# Patient Record
Sex: FEMALE | Race: OTHER | NOT HISPANIC OR LATINO | URBAN - METROPOLITAN AREA
[De-identification: names, ages, dates, MRNs, and addresses within clinical notes are randomized per-mention and may not be internally consistent; named-entity substitution may affect disease eponyms.]

---

## 2024-05-22 ENCOUNTER — OFFICE VISIT (OUTPATIENT)
Dept: OBSTETRICS AND GYNECOLOGY | Facility: CLINIC | Age: 29
End: 2024-05-22
Payer: MEDICAID

## 2024-05-22 VITALS
WEIGHT: 216.88 LBS | DIASTOLIC BLOOD PRESSURE: 82 MMHG | HEIGHT: 66 IN | SYSTOLIC BLOOD PRESSURE: 120 MMHG | BODY MASS INDEX: 34.86 KG/M2

## 2024-05-22 DIAGNOSIS — Z32.00 ENCOUNTER FOR CONFIRMATION OF PREGNANCY TEST RESULT WITH PHYSICAL EXAMINATION: Primary | ICD-10-CM

## 2024-05-22 DIAGNOSIS — Z32.01 POSITIVE PREGNANCY TEST: ICD-10-CM

## 2024-05-22 LAB
B-HCG UR QL: POSITIVE
CTP QC/QA: YES

## 2024-05-22 PROCEDURE — 81025 URINE PREGNANCY TEST: CPT | Mod: PBBFAC | Performed by: OBSTETRICS & GYNECOLOGY

## 2024-05-22 PROCEDURE — 3008F BODY MASS INDEX DOCD: CPT | Mod: CPTII,,, | Performed by: OBSTETRICS & GYNECOLOGY

## 2024-05-22 PROCEDURE — 87591 N.GONORRHOEAE DNA AMP PROB: CPT | Performed by: OBSTETRICS & GYNECOLOGY

## 2024-05-22 PROCEDURE — 3079F DIAST BP 80-89 MM HG: CPT | Mod: CPTII,,, | Performed by: OBSTETRICS & GYNECOLOGY

## 2024-05-22 PROCEDURE — 87086 URINE CULTURE/COLONY COUNT: CPT | Performed by: OBSTETRICS & GYNECOLOGY

## 2024-05-22 PROCEDURE — 99999 PR PBB SHADOW E&M-NEW PATIENT-LVL II: CPT | Mod: PBBFAC,,, | Performed by: OBSTETRICS & GYNECOLOGY

## 2024-05-22 PROCEDURE — 99203 OFFICE O/P NEW LOW 30 MIN: CPT | Mod: S$PBB,TH,, | Performed by: OBSTETRICS & GYNECOLOGY

## 2024-05-22 PROCEDURE — 99202 OFFICE O/P NEW SF 15 MIN: CPT | Mod: PBBFAC,TH | Performed by: OBSTETRICS & GYNECOLOGY

## 2024-05-22 PROCEDURE — 3074F SYST BP LT 130 MM HG: CPT | Mod: CPTII,,, | Performed by: OBSTETRICS & GYNECOLOGY

## 2024-05-22 PROCEDURE — 99999PBSHW POCT URINE PREGNANCY: Mod: PBBFAC,,,

## 2024-05-22 RX ORDER — PRENATAL WITH FERROUS FUM AND FOLIC ACID 3080; 920; 120; 400; 22; 1.84; 3; 20; 10; 1; 12; 200; 27; 25; 2 [IU]/1; [IU]/1; MG/1; [IU]/1; MG/1; MG/1; MG/1; MG/1; MG/1; MG/1; UG/1; MG/1; MG/1; MG/1; MG/1
1 TABLET ORAL DAILY
Qty: 90 TABLET | Refills: 3 | Status: SHIPPED | OUTPATIENT
Start: 2024-05-22 | End: 2025-05-22

## 2024-05-22 NOTE — PROGRESS NOTES
"[facility]  Ambulatory Clinic  Obstetrics & Gynecology    Visit Date:  2024     Chief Complaint:  Missed my period    History of Present Illness:      Juan Mcdaniels is a 28 y.o. , UPT positive today, here with c/o missed period.    LMP Patient's last menstrual period was 2024.      Pt has no major complaints today and denies any vaginal bleeding, discharge, pain, GI/ compliants.      Pt reports overall good health.    OBH - current  PMH - none  PSH - none  MEDS - PNV  ALL - NKDA  SH - denies tobacco, etoh, drugs  FH - denies congenital defects, aneuploidy   GYN Hx - denies STI, abn pap    Review of Systems:      Constitutional:  No fever, fatigue  HENT:  No congestion, hearing changes  Eyes:  No visual disturbance  Respiratory:  No cough, shortness of breath  Cardiovascular:  No chest pain, leg swelling  Breast:  No lump, pain, nipple discharge, redness, skin changes  Gastrointestinal:  No abdominal pain, constipation, blood in stool   Genitourinary:  No dysuria, frequency  Endocrine:  No heat or cold intolerance  Musculoskeletal:  No back pain, arthralgias  Skin:  No rash, jaundice  Neurological:  No dizziness, weakness, headaches  Psychiatric/Behavioral:  No sleep disturbance, dysphoric mood     Physical Exam:     /82   Ht 5' 6" (1.676 m)   Wt 98.4 kg (216 lb 13.9 oz)   LMP 2024   BMI 35.00 kg/m²      GENERAL:  NAD. Well-nourished. A&Ox3.  HEENT:  NCAT, EOMI, moist mucus membranes.  Neck supple w/o masses.  BREAST:  Pt declined.  LUNGS:  CTA-B.  HEART:  RRR, physiologic heart sounds.  ABDOMEN:  Soft, non-tender. Normoactive BS.  No obvious organomegaly.    EXT:  Symmetric w/o cramping, claudication, or edema. +2 distal pulses.   SKIN:  No rashes  NEURO:  Grossly intact bilaterally.   PSYCH:  Mood & affect appropriate.     PELVIC:  Pt declined.    Chaperone present for exam.    Assessment:     28 y.o. , UPT positive, LMP 24, here for confirmation of " pregnancy    Plan:    We discussed principles of prenatal care, weight gain goals, dietary/lifestyle modifications, pregnancy care instructions and precautions.  Continue prenatal vitamins.  SAB and ectopic precautions reviewed.      Return in 4 weeks for OB visit, or sooner prn.  All questions answered, pt voiced understanding.      Andrew Lam MD

## 2024-05-24 LAB — BACTERIA UR CULT: NORMAL

## 2024-05-25 LAB
C TRACH DNA SPEC QL NAA+PROBE: NOT DETECTED
N GONORRHOEA DNA SPEC QL NAA+PROBE: NOT DETECTED

## 2024-05-29 ENCOUNTER — TELEPHONE (OUTPATIENT)
Dept: OBSTETRICS AND GYNECOLOGY | Facility: CLINIC | Age: 29
End: 2024-05-29
Payer: MEDICAID

## 2024-05-29 NOTE — TELEPHONE ENCOUNTER
Pt mailbox is not set up at this time.    ----- Message from Hanna Connor sent at 5/29/2024 10:37 AM CDT -----  Type: Patient Call Back    Who called:pt's  yuliet [phone]     What is the request in detail:calling to discuss taking vitamins for pregnancy. Call pt     Can the clinic reply by [facility]?    Would the patient rather a call back or a response via [facility]? call    Best call back number:    Additional Information:

## 2024-05-30 ENCOUNTER — NURSE TRIAGE (OUTPATIENT)
Dept: ADMINISTRATIVE | Facility: CLINIC | Age: 29
End: 2024-05-30
Payer: MEDICAID

## 2024-05-30 ENCOUNTER — TELEPHONE (OUTPATIENT)
Dept: OBSTETRICS AND GYNECOLOGY | Facility: CLINIC | Age: 29
End: 2024-05-30
Payer: MEDICAID

## 2024-05-30 NOTE — TELEPHONE ENCOUNTER
Patient states she is currently 6 weeks gestation and states c/o intermittent abdominal cramping and vaginal bleeding.     Patient advised to See OB/Gyn/PCP or Visit an Urgent Care Center in office today. Patient also advised to contact the O Triage Service for any worsening symptoms. Patient states understanding of care advice.     Reason for Disposition   Intermittent lower abdominal pain (e.g., cramping) lasting > 24 hours    Additional Information   Negative: Shock suspected (e.g., cold/pale/clammy skin, too weak to stand, low BP, rapid pulse)   Negative: Difficult to awaken or acting confused (e.g., disoriented, slurred speech)   Negative: Passed out (i.e., fainted, collapsed and was not responding)   Negative: Sounds like a life-threatening emergency to the triager   Negative: Vaginal bleeding and pregnant 20 or more weeks   Negative: Not pregnant or pregnancy status unknown   Negative: SEVERE abdominal pain (e.g., excruciating)   Negative: SEVERE vaginal bleeding (e.g., soaking 2 pads or tampons per hour and present 2 or more hours; 1 menstrual cup every 2 hours)   Negative: SEVERE dizziness (e.g., unable to stand, requires support to walk, feels like passing out)   Negative: MODERATE vaginal bleeding (i.e., soaking 1 pad) and present > 6 hours   Negative: MODERATE vaginal bleeding (i.e., soaking 1 pad / hour, clots) and pregnant > 12 weeks   Negative: Passed tissue (e.g., gray-white)   Negative: Shoulder pain   Negative: Constant abdominal pain lasting > 1 hour   Negative: Fever 100.4 F (38.0 C) or higher   Negative: Pale skin (pallor) of new-onset or worsening   Negative: Patient sounds very sick or weak to the triager   Negative: MODERATE vaginal bleeding (i.e., soaking 1 pad / hour; clots)    Protocols used: Pregnancy - Vaginal Bleeding Less Than 20 Weeks EGA-A-OH

## 2024-05-30 NOTE — TELEPHONE ENCOUNTER
Call the check up on pt. Pt is doing well. Pt was concerned that she is spotting at this time. I informed pt that it is normal at this time. I she starts to bleed heavy she needs to call for a apt or to go to the ER for evaluation. Pt voiced understanding.

## 2024-06-06 ENCOUNTER — E-VISIT (OUTPATIENT)
Dept: OBSTETRICS AND GYNECOLOGY | Facility: CLINIC | Age: 29
End: 2024-06-06
Payer: MEDICAID

## 2024-06-06 DIAGNOSIS — O21.9 NAUSEA AND VOMITING DURING PREGNANCY: Primary | ICD-10-CM

## 2024-06-06 RX ORDER — ONDANSETRON 4 MG/1
4 TABLET, ORALLY DISINTEGRATING ORAL EVERY 6 HOURS PRN
Qty: 30 TABLET | Refills: 1 | Status: SHIPPED | OUTPATIENT
Start: 2024-06-06

## 2024-06-06 NOTE — PROGRESS NOTES
Patient ID: Juan Mcdaniels is a 28 y.o. female.    Chief Complaint: GI Problem (Entered automatically based on patient selection in Patient Portal.)    The patient initiated a request through [employer] on 6/6/2024 for evaluation and management with a chief complaint of GI Problem (Entered automatically based on patient selection in Patient Portal.)     I evaluated the questionnaire submission on nausea.    [facility] Peq Evisit Diarrhea    6/6/2024  1:01 PM CDT - Filed by Patient   Do you agree to participate in an E-Visit? Yes   If you have any of the following symptoms, please present to your local emergency room or call 911:  I acknowledge   Are you pregnant, could you be pregnant, or are you breast feeding? Pregnant   What is the main issue you would like addressed today? Every time i eat i through up (nausea) , pain in stomic (period pain)   Do you have diarrhea? No   Are you vomiting? Yes, I am vomiting frequently   Are you able to keep down fluids? No, everything comes right back up   Do you have belly pain? I have pain in the lower part of my belly   Are you feeling dizzy or like you might pass out? No   When did your symptoms begin? 5/28/2024   Do you have a fever? No, I do not have a fever   Are you having trouble walking or lifting yourself due to weakness from this illness?  No   Do any of the following apply to you? My urine is normal   Did your condition begin after a specific meal that may have caused the illness? Not clearly related to a meal.    Have you taken antibiotics recently? I have not been on any antibiotics   Have you been hospitalized in the past 2 months? No, I have not been hospitalized recently.   Do you work in a  center or healthcare environment? No   Does anyone you know have similar symptoms? No   Have you had a meal consisting of raw meat or fish in the week prior to your illness? No   Have you recently travelled to a place where you may have caught an illness? No   Have you tried  any medication or other treatment for your symptoms? No   Provide any additional information you feel is important.    Please attach any relevant images or files    Are you able to take your vital signs? No         Encounter Diagnosis   Name Primary?    Nausea and vomiting during pregnancy Yes        No orders of the defined types were placed in this encounter.     Medications Ordered This Encounter   Medications    ondansetron (ZOFRAN-ODT) 4 MG TbDL     Sig: Take 1 tablet (4 mg total) by mouth every 6 (six) hours as needed.     Dispense:  30 tablet     Refill:  1

## 2024-06-19 ENCOUNTER — INITIAL PRENATAL (OUTPATIENT)
Dept: OBSTETRICS AND GYNECOLOGY | Facility: CLINIC | Age: 29
End: 2024-06-19
Payer: MEDICAID

## 2024-06-19 VITALS
SYSTOLIC BLOOD PRESSURE: 118 MMHG | WEIGHT: 212.94 LBS | DIASTOLIC BLOOD PRESSURE: 80 MMHG | BODY MASS INDEX: 34.37 KG/M2

## 2024-06-19 DIAGNOSIS — O99.210 OBESITY AFFECTING PREGNANCY, ANTEPARTUM, UNSPECIFIED OBESITY TYPE: Primary | ICD-10-CM

## 2024-06-19 DIAGNOSIS — Z32.01 PREGNANCY CONFIRMED BY POSITIVE URINE TEST: ICD-10-CM

## 2024-06-19 DIAGNOSIS — Z12.4 ENCOUNTER FOR PAPANICOLAOU SMEAR FOR CERVICAL CANCER SCREENING: ICD-10-CM

## 2024-06-19 PROCEDURE — 88175 CYTOPATH C/V AUTO FLUID REDO: CPT | Performed by: OBSTETRICS & GYNECOLOGY

## 2024-06-19 PROCEDURE — 99999 PR PBB SHADOW E&M-EST. PATIENT-LVL II: CPT | Mod: PBBFAC,,, | Performed by: OBSTETRICS & GYNECOLOGY

## 2024-06-19 PROCEDURE — 99212 OFFICE O/P EST SF 10 MIN: CPT | Mod: PBBFAC,TH | Performed by: OBSTETRICS & GYNECOLOGY

## 2024-06-19 RX ORDER — DOCUSATE SODIUM 100 MG/1
100 CAPSULE, LIQUID FILLED ORAL 2 TIMES DAILY
Qty: 60 CAPSULE | Refills: 3 | Status: SHIPPED | OUTPATIENT
Start: 2024-06-19 | End: 2025-06-19

## 2024-06-19 NOTE — PROGRESS NOTES
Patient presented today for new Ob appt.    Planned pregnancy: Yes    ROS: NEG except, planning to travel [place] next week, will come back in August    VB?: no  Cramping?:  no  Nausea/vomitting?: no    PMH: obesity  PSH: negative  OBH: G1  GynHx: reg cycles, denies std    /80   Wt 96.6 kg (212 lb 15.4 oz)   LMP 04/11/2024   BMI 34.37 kg/m²   Phys Exam:  Abdomen: soft,NT, ND  Vulva and vagina appear normal. Bimanual exam reveals normal uterus and adnexa.    Transabdominal US:  single IUP,  bpm, appx 8-9 weeks    1.  Early IUP:       -prenatal labs today       -papsmear done today       -Bleeding precautions       -Aneuploidy screen offered. Patient desires aneuploidy screening once dating US is done       -Diet, exercise, and social habits d/w patients. Patient was counseled today on  proper weight gain based on the [employer]'s recommendations based  on her pre-pregnancy BMI of 34, total pregnancy weight gain of 11-19 lbs.       -Discussed foods to avoid in pregnancy (i.e. sushi, fish that are high in mercury,  deli meat, and unpasteurized cheeses). Discussed  prenatal vitamin options and  safe medications in pregnancy.     2. Obesity:  rx for ASA 81 after 12 weeks  3.  Discussed how call group function, we do most of our deliveries however other providers will be covering and possibility will be there at the time of deliveries.  Pt voiced understanding.  4. Travel precautions given, compression stocking  5.  RTC in 4 weeks for Belinda visit

## 2024-06-19 NOTE — LETTER
June 19, 2024    Juan Mcdaniels  [address]  [place] [address]             [facility] - OB GYN  [address]   [facility] [address]  Phone: [phone] To whom it may concern,    Juan Mcdaniels is currently under our care during her pregnancy. She is currently 9 weeks pregnant. She can only get scanned by Ultrasound only. She is not to have any other radiation.     If you have any questions or concerns, please don't hesitate to call.    Sincerely,        Jannet Peña MD

## 2024-06-20 ENCOUNTER — TELEPHONE (OUTPATIENT)
Dept: MATERNAL FETAL MEDICINE | Facility: CLINIC | Age: 29
End: 2024-06-20
Payer: MEDICAID

## 2024-06-20 ENCOUNTER — PATIENT MESSAGE (OUTPATIENT)
Dept: OBSTETRICS AND GYNECOLOGY | Facility: CLINIC | Age: 29
End: 2024-06-20
Payer: MEDICAID

## 2024-06-20 LAB
CLINICAL INFO: NORMAL
DATE OF PREVIOUS PAP: NORMAL
DATE PREVIOUS BX: NO
LMP START DATE: NORMAL
SPECIMEN SOURCE CVX/VAG CYTO: NORMAL

## 2024-06-20 NOTE — TELEPHONE ENCOUNTER
Call to patient to schedule her ultrasound. She state she is traveling and leaving on Monday and will not be back till Aug. Explained the office did not have anything until June 26th so she would already be gone. She stated she is not back until the end of Aug so explained I would cancel her order and when she came back and saw Dr. Peña a new ultrasound could be ordered.

## 2024-07-27 ENCOUNTER — PATIENT MESSAGE (OUTPATIENT)
Dept: OBSTETRICS AND GYNECOLOGY | Facility: CLINIC | Age: 29
End: 2024-07-27
Payer: MEDICAID

## 2024-09-11 NOTE — TELEPHONE ENCOUNTER
Refill Routing Note   Medication(s) are not appropriate for processing by [facility] for the following reason(s):        Outside of protocol    [facility] action(s):  Route               Appointments  past 12m or future 3m with PCP    Date Provider   Last Visit   6/19/2024 Jannet Peña MD   Next Visit   10/25/2024 Jannet Peña MD   ED visits in past 90 days: 0        Note composed:8:45 AM 09/11/2024

## 2024-09-12 RX ORDER — DOCUSATE SODIUM 100 MG/1
100 CAPSULE, LIQUID FILLED ORAL 2 TIMES DAILY
Qty: 60 CAPSULE | Refills: 0 | Status: SHIPPED | OUTPATIENT
Start: 2024-09-12

## 2024-09-13 ENCOUNTER — PATIENT MESSAGE (OUTPATIENT)
Dept: OBSTETRICS AND GYNECOLOGY | Facility: CLINIC | Age: 29
End: 2024-09-13
Payer: MEDICAID

## 2024-10-04 ENCOUNTER — PATIENT MESSAGE (OUTPATIENT)
Dept: OBSTETRICS AND GYNECOLOGY | Facility: CLINIC | Age: 29
End: 2024-10-04
Payer: MEDICAID

## 2024-10-16 ENCOUNTER — LAB VISIT (OUTPATIENT)
Dept: LAB | Facility: HOSPITAL | Age: 29
End: 2024-10-16
Attending: OBSTETRICS & GYNECOLOGY
Payer: MEDICAID

## 2024-10-16 ENCOUNTER — ROUTINE PRENATAL (OUTPATIENT)
Dept: OBSTETRICS AND GYNECOLOGY | Facility: CLINIC | Age: 29
End: 2024-10-16
Payer: MEDICAID

## 2024-10-16 ENCOUNTER — PROCEDURE VISIT (OUTPATIENT)
Dept: MATERNAL FETAL MEDICINE | Facility: CLINIC | Age: 29
End: 2024-10-16
Payer: MEDICAID

## 2024-10-16 VITALS
WEIGHT: 230.81 LBS | DIASTOLIC BLOOD PRESSURE: 80 MMHG | SYSTOLIC BLOOD PRESSURE: 120 MMHG | BODY MASS INDEX: 37.26 KG/M2

## 2024-10-16 DIAGNOSIS — O99.210 OBESITY AFFECTING PREGNANCY, ANTEPARTUM, UNSPECIFIED OBESITY TYPE: Primary | ICD-10-CM

## 2024-10-16 DIAGNOSIS — Z32.01 PREGNANCY CONFIRMED BY POSITIVE URINE TEST: ICD-10-CM

## 2024-10-16 DIAGNOSIS — O99.210 OBESITY AFFECTING PREGNANCY, ANTEPARTUM, UNSPECIFIED OBESITY TYPE: ICD-10-CM

## 2024-10-16 LAB
ABO + RH BLD: NORMAL
BLD GP AB SCN CELLS X3 SERPL QL: NORMAL
ERYTHROCYTE [DISTWIDTH] IN BLOOD BY AUTOMATED COUNT: 13.5 % (ref 11.5–14.5)
HAV IGM SERPL QL IA: NORMAL
HAV IGM SERPL QL IA: NORMAL
HBV CORE IGM SERPL QL IA: NORMAL
HBV CORE IGM SERPL QL IA: NORMAL
HBV SURFACE AG SERPL QL IA: NORMAL
HBV SURFACE AG SERPL QL IA: NORMAL
HCT VFR BLD AUTO: 34.7 % (ref 37–48.5)
HCV AB SERPL QL IA: NORMAL
HCV AB SERPL QL IA: NORMAL
HGB BLD-MCNC: 11.9 G/DL (ref 12–16)
HIV 1+2 AB+HIV1 P24 AG SERPL QL IA: NORMAL
MCH RBC QN AUTO: 29.2 PG (ref 27–31)
MCHC RBC AUTO-ENTMCNC: 34.3 G/DL (ref 32–36)
MCV RBC AUTO: 85 FL (ref 82–98)
PLATELET # BLD AUTO: 213 K/UL (ref 150–450)
PMV BLD AUTO: 9.1 FL (ref 9.2–12.9)
RBC # BLD AUTO: 4.07 M/UL (ref 4–5.4)
SPECIMEN OUTDATE: NORMAL
TREPONEMA PALLIDUM IGG+IGM AB [PRESENCE] IN SERUM OR PLASMA BY IMMUNOASSAY: NONREACTIVE
WBC # BLD AUTO: 7.5 K/UL (ref 3.9–12.7)

## 2024-10-16 PROCEDURE — 86593 SYPHILIS TEST NON-TREP QUANT: CPT | Performed by: OBSTETRICS & GYNECOLOGY

## 2024-10-16 PROCEDURE — 87389 HIV-1 AG W/HIV-1&-2 AB AG IA: CPT | Performed by: OBSTETRICS & GYNECOLOGY

## 2024-10-16 PROCEDURE — 86901 BLOOD TYPING SEROLOGIC RH(D): CPT | Performed by: OBSTETRICS & GYNECOLOGY

## 2024-10-16 PROCEDURE — 80074 ACUTE HEPATITIS PANEL: CPT | Performed by: OBSTETRICS & GYNECOLOGY

## 2024-10-16 PROCEDURE — 81220 CFTR GENE COM VARIANTS: CPT | Performed by: OBSTETRICS & GYNECOLOGY

## 2024-10-16 PROCEDURE — 86762 RUBELLA ANTIBODY: CPT | Performed by: OBSTETRICS & GYNECOLOGY

## 2024-10-16 PROCEDURE — 99211 OFF/OP EST MAY X REQ PHY/QHP: CPT | Mod: PBBFAC,25,TH | Performed by: OBSTETRICS & GYNECOLOGY

## 2024-10-16 PROCEDURE — 86850 RBC ANTIBODY SCREEN: CPT | Performed by: OBSTETRICS & GYNECOLOGY

## 2024-10-16 PROCEDURE — 83020 HEMOGLOBIN ELECTROPHORESIS: CPT | Performed by: OBSTETRICS & GYNECOLOGY

## 2024-10-16 PROCEDURE — 99999 PR PBB SHADOW E&M-EST. PATIENT-LVL I: CPT | Mod: PBBFAC,,, | Performed by: OBSTETRICS & GYNECOLOGY

## 2024-10-16 PROCEDURE — 86900 BLOOD TYPING SEROLOGIC ABO: CPT | Performed by: OBSTETRICS & GYNECOLOGY

## 2024-10-16 PROCEDURE — 85027 COMPLETE CBC AUTOMATED: CPT | Performed by: OBSTETRICS & GYNECOLOGY

## 2024-10-16 RX ORDER — ASPIRIN 81 MG/1
81 TABLET ORAL DAILY
Qty: 90 TABLET | Refills: 2 | Status: SHIPPED | OUTPATIENT
Start: 2024-10-16 | End: 2025-10-16

## 2024-10-16 NOTE — PROGRESS NOTES
Here for routine OB visit  Denies VB/ctx/LOF. Active FM  Denies PreE symptoms/UTI symptoms  ROS: just came [place] from [place].  Has not had dating US here. She did find out the gender of baby while overseas, baby boy    General:  NAD, AxO x 3  Abdomen: soft, gravid, non-tender  Ext: peripheral pulses normal, no pedal edema, no clubbing or cyanosis  Skin: normal, good color, and good turgor     Dating and anatomy US today  2. Obesity: on ASA 81 mg  3.  Flu vaccine today    Strict Labor/bleeding precautions, FKCs    Next visit: 4 weeks

## 2024-10-17 ENCOUNTER — PATIENT MESSAGE (OUTPATIENT)
Dept: OBSTETRICS AND GYNECOLOGY | Facility: CLINIC | Age: 29
End: 2024-10-17
Payer: MEDICAID

## 2024-10-17 DIAGNOSIS — Z36.2 ENCOUNTER FOR FOLLOW-UP ULTRASOUND OF FETAL ANATOMY: Primary | ICD-10-CM

## 2024-10-17 LAB
RUBV IGG SER-ACNC: 49.9 IU/ML
RUBV IGG SER-IMP: REACTIVE

## 2024-10-18 LAB
CFTR MUT ANL BLD/T: NORMAL
HB ELECTROPHORESIS INTERP CANCEL: NORMAL
HB ELECTROPHORESIS INTERPRETATION: NORMAL
HGB A MFR BLD ELPH: 97.3 % (ref 95.8–98)
HGB A2 MFR BLD: 2.7 % (ref 2–3.3)
HGB A2+XXX MFR BLD ELPH: NORMAL %
HGB F MFR BLD: 0 % (ref 0–0.9)
HGB XXX MFR BLD ELPH: NORMAL %
HPLC HB VARIANT: NORMAL

## 2024-11-01 ENCOUNTER — PATIENT MESSAGE (OUTPATIENT)
Dept: OBSTETRICS AND GYNECOLOGY | Facility: CLINIC | Age: 29
End: 2024-11-01
Payer: MEDICAID

## 2024-11-13 ENCOUNTER — LAB VISIT (OUTPATIENT)
Dept: LAB | Facility: HOSPITAL | Age: 29
End: 2024-11-13
Attending: OBSTETRICS & GYNECOLOGY
Payer: MEDICAID

## 2024-11-13 ENCOUNTER — ROUTINE PRENATAL (OUTPATIENT)
Dept: OBSTETRICS AND GYNECOLOGY | Facility: CLINIC | Age: 29
End: 2024-11-13
Payer: MEDICAID

## 2024-11-13 ENCOUNTER — CLINICAL SUPPORT (OUTPATIENT)
Dept: OBSTETRICS AND GYNECOLOGY | Facility: CLINIC | Age: 29
End: 2024-11-13
Payer: MEDICAID

## 2024-11-13 ENCOUNTER — PATIENT MESSAGE (OUTPATIENT)
Dept: OBSTETRICS AND GYNECOLOGY | Facility: CLINIC | Age: 29
End: 2024-11-13

## 2024-11-13 VITALS
BODY MASS INDEX: 37.79 KG/M2 | SYSTOLIC BLOOD PRESSURE: 118 MMHG | DIASTOLIC BLOOD PRESSURE: 80 MMHG | WEIGHT: 234.13 LBS

## 2024-11-13 DIAGNOSIS — Z23 FLU VACCINE NEED: ICD-10-CM

## 2024-11-13 DIAGNOSIS — Z3A.31 31 WEEKS GESTATION OF PREGNANCY: ICD-10-CM

## 2024-11-13 DIAGNOSIS — O99.210 OBESITY AFFECTING PREGNANCY, ANTEPARTUM, UNSPECIFIED OBESITY TYPE: ICD-10-CM

## 2024-11-13 DIAGNOSIS — R73.09 ABNORMAL GLUCOSE: Primary | ICD-10-CM

## 2024-11-13 DIAGNOSIS — O99.210 OBESITY AFFECTING PREGNANCY, ANTEPARTUM, UNSPECIFIED OBESITY TYPE: Primary | ICD-10-CM

## 2024-11-13 DIAGNOSIS — Z23 NEED FOR TDAP VACCINATION: Primary | ICD-10-CM

## 2024-11-13 LAB
BASOPHILS # BLD AUTO: 0.02 K/UL (ref 0–0.2)
BASOPHILS NFR BLD: 0.3 % (ref 0–1.9)
DIFFERENTIAL METHOD BLD: ABNORMAL
EOSINOPHIL # BLD AUTO: 0 K/UL (ref 0–0.5)
EOSINOPHIL NFR BLD: 0.5 % (ref 0–8)
ERYTHROCYTE [DISTWIDTH] IN BLOOD BY AUTOMATED COUNT: 13.4 % (ref 11.5–14.5)
GLUCOSE SERPL-MCNC: 161 MG/DL (ref 70–140)
HCT VFR BLD AUTO: 33.5 % (ref 37–48.5)
HGB BLD-MCNC: 10.8 G/DL (ref 12–16)
HIV 1+2 AB+HIV1 P24 AG SERPL QL IA: NORMAL
IMM GRANULOCYTES # BLD AUTO: 0.11 K/UL (ref 0–0.04)
IMM GRANULOCYTES NFR BLD AUTO: 1.7 % (ref 0–0.5)
LYMPHOCYTES # BLD AUTO: 1.9 K/UL (ref 1–4.8)
LYMPHOCYTES NFR BLD: 29 % (ref 18–48)
MCH RBC QN AUTO: 27.8 PG (ref 27–31)
MCHC RBC AUTO-ENTMCNC: 32.2 G/DL (ref 32–36)
MCV RBC AUTO: 86 FL (ref 82–98)
MONOCYTES # BLD AUTO: 0.3 K/UL (ref 0.3–1)
MONOCYTES NFR BLD: 5.1 % (ref 4–15)
NEUTROPHILS # BLD AUTO: 4.2 K/UL (ref 1.8–7.7)
NEUTROPHILS NFR BLD: 63.4 % (ref 38–73)
NRBC BLD-RTO: 0 /100 WBC
PLATELET # BLD AUTO: 187 K/UL (ref 150–450)
PMV BLD AUTO: 9.6 FL (ref 9.2–12.9)
RBC # BLD AUTO: 3.89 M/UL (ref 4–5.4)
TREPONEMA PALLIDUM IGG+IGM AB [PRESENCE] IN SERUM OR PLASMA BY IMMUNOASSAY: NONREACTIVE
WBC # BLD AUTO: 6.63 K/UL (ref 3.9–12.7)

## 2024-11-13 PROCEDURE — 90472 IMMUNIZATION ADMIN EACH ADD: CPT | Mod: PBBFAC

## 2024-11-13 PROCEDURE — 90715 TDAP VACCINE 7 YRS/> IM: CPT | Mod: PBBFAC

## 2024-11-13 PROCEDURE — 87389 HIV-1 AG W/HIV-1&-2 AB AG IA: CPT | Performed by: OBSTETRICS & GYNECOLOGY

## 2024-11-13 PROCEDURE — 85025 COMPLETE CBC W/AUTO DIFF WBC: CPT | Performed by: OBSTETRICS & GYNECOLOGY

## 2024-11-13 PROCEDURE — 90656 IIV3 VACC NO PRSV 0.5 ML IM: CPT | Mod: PBBFAC

## 2024-11-13 PROCEDURE — 82950 GLUCOSE TEST: CPT | Performed by: OBSTETRICS & GYNECOLOGY

## 2024-11-13 PROCEDURE — 99999 PR PBB SHADOW E&M-EST. PATIENT-LVL II: CPT | Mod: PBBFAC,,, | Performed by: OBSTETRICS & GYNECOLOGY

## 2024-11-13 PROCEDURE — 99212 OFFICE O/P EST SF 10 MIN: CPT | Mod: PBBFAC,TH | Performed by: OBSTETRICS & GYNECOLOGY

## 2024-11-13 PROCEDURE — 99213 OFFICE O/P EST LOW 20 MIN: CPT | Mod: TH,S$PBB,, | Performed by: OBSTETRICS & GYNECOLOGY

## 2024-11-13 PROCEDURE — 36415 COLL VENOUS BLD VENIPUNCTURE: CPT | Performed by: OBSTETRICS & GYNECOLOGY

## 2024-11-13 PROCEDURE — 99999PBSHW PR PBB SHADOW TECHNICAL ONLY FILED TO HB: Mod: PBBFAC,,,

## 2024-11-13 PROCEDURE — 86593 SYPHILIS TEST NON-TREP QUANT: CPT | Performed by: OBSTETRICS & GYNECOLOGY

## 2024-11-13 PROCEDURE — 90471 IMMUNIZATION ADMIN: CPT | Mod: PBBFAC

## 2024-11-13 RX ORDER — DOCUSATE SODIUM 100 MG/1
100 CAPSULE, LIQUID FILLED ORAL 2 TIMES DAILY
Qty: 60 CAPSULE | Refills: 11 | Status: SHIPPED | OUTPATIENT
Start: 2024-11-13 | End: 2025-11-13

## 2024-11-13 RX ORDER — FERROUS SULFATE 325(65) MG
325 TABLET, DELAYED RELEASE (ENTERIC COATED) ORAL DAILY
Qty: 90 TABLET | Refills: 2 | Status: SHIPPED | OUTPATIENT
Start: 2024-11-13

## 2024-11-13 RX ADMIN — INFLUENZA VIRUS VACCINE 0.5 ML: 15; 15; 15 SUSPENSION INTRAMUSCULAR at 10:11

## 2024-11-13 RX ADMIN — TETANUS TOXOID, REDUCED DIPHTHERIA TOXOID AND ACELLULAR PERTUSSIS VACCINE, ADSORBED 0.5 ML: 5; 2.5; 8; 8; 2.5 SUSPENSION INTRAMUSCULAR at 10:11

## 2024-11-13 NOTE — PROGRESS NOTES
Flu and Tdap vaccinations administered without difficulty. Vaccine information sheets given. Pt instructed to sit in waiting room for 15 minutes to monitor for s/s of adverse reaction.

## 2024-11-13 NOTE — PROGRESS NOTES
She is slightly anemic. I have sent rx for ferrous sulfate to take once daily as well as stool softener.     Pt with abnormal glucose test  Will need 3 hour GTT  This is a fasting lab, please advise pt she will need to fast for 8-10 hours prior to this

## 2024-11-13 NOTE — PROGRESS NOTES
Here for routine OB visit  Denies VB/ctx/LOF. Active FM  Denies PreE symptoms/UTI symptoms  ROS: doing well      General:  NAD, AxO x 3  Abdomen: soft, gravid, non-tender  Ext: peripheral pulses normal, no pedal edema, no clubbing or cyanosis  Skin: normal, good color, and good turgor     Late dating at 27 week, pt will bring records from [place] to compare  2. Obesity: on ASA 81 mg  3.  Flu vaccine today/tdap  4.  Discussed weight curve, cut down on carb,   5.  3rd trim lab today    Strict Labor/bleeding precautions, FKCs    Next visit:  2 weeks

## 2024-11-15 ENCOUNTER — LAB VISIT (OUTPATIENT)
Dept: LAB | Facility: HOSPITAL | Age: 29
End: 2024-11-15
Attending: OBSTETRICS & GYNECOLOGY
Payer: MEDICAID

## 2024-11-15 DIAGNOSIS — R73.09 ABNORMAL GLUCOSE: ICD-10-CM

## 2024-11-15 LAB
GLUCOSE SERPL-MCNC: 126 MG/DL
GLUCOSE SERPL-MCNC: 127 MG/DL
GLUCOSE SERPL-MCNC: 171 MG/DL
GLUCOSE SERPL-MCNC: 85 MG/DL (ref 70–110)

## 2024-11-15 PROCEDURE — 82951 GLUCOSE TOLERANCE TEST (GTT): CPT | Performed by: OBSTETRICS & GYNECOLOGY

## 2024-11-15 PROCEDURE — 36415 COLL VENOUS BLD VENIPUNCTURE: CPT | Performed by: OBSTETRICS & GYNECOLOGY

## 2024-11-17 ENCOUNTER — PATIENT MESSAGE (OUTPATIENT)
Dept: OTHER | Facility: OTHER | Age: 29
End: 2024-11-17
Payer: MEDICAID

## 2024-11-21 ENCOUNTER — PROCEDURE VISIT (OUTPATIENT)
Dept: MATERNAL FETAL MEDICINE | Facility: CLINIC | Age: 29
End: 2024-11-21
Payer: MEDICAID

## 2024-11-21 DIAGNOSIS — Z36.2 ENCOUNTER FOR FOLLOW-UP ULTRASOUND OF FETAL ANATOMY: ICD-10-CM

## 2024-11-21 PROCEDURE — 76816 OB US FOLLOW-UP PER FETUS: CPT | Mod: PBBFAC | Performed by: OBSTETRICS & GYNECOLOGY

## 2024-11-27 ENCOUNTER — ROUTINE PRENATAL (OUTPATIENT)
Dept: OBSTETRICS AND GYNECOLOGY | Facility: CLINIC | Age: 29
End: 2024-11-27
Payer: MEDICAID

## 2024-11-27 VITALS
SYSTOLIC BLOOD PRESSURE: 118 MMHG | WEIGHT: 236.13 LBS | DIASTOLIC BLOOD PRESSURE: 80 MMHG | BODY MASS INDEX: 38.11 KG/M2

## 2024-11-27 DIAGNOSIS — O99.210 OBESITY AFFECTING PREGNANCY, ANTEPARTUM, UNSPECIFIED OBESITY TYPE: Primary | ICD-10-CM

## 2024-11-27 DIAGNOSIS — Z3A.33 33 WEEKS GESTATION OF PREGNANCY: ICD-10-CM

## 2024-11-27 PROCEDURE — 99212 OFFICE O/P EST SF 10 MIN: CPT | Mod: PBBFAC,TH | Performed by: OBSTETRICS & GYNECOLOGY

## 2024-11-27 PROCEDURE — 99999 PR PBB SHADOW E&M-EST. PATIENT-LVL II: CPT | Mod: PBBFAC,,, | Performed by: OBSTETRICS & GYNECOLOGY

## 2024-11-27 RX ORDER — RSV VACC, PREF A AND PREF B/PF 120MCG/0.5
VIAL (EA) INTRAMUSCULAR
Qty: 0.5 ML | Refills: 0 | Status: SHIPPED | OUTPATIENT
Start: 2024-11-27 | End: 2024-11-27 | Stop reason: SDUPTHER

## 2024-11-27 RX ORDER — RSV VACC, PREF A AND PREF B/PF 120MCG/0.5
VIAL (EA) INTRAMUSCULAR
Qty: 0.5 ML | Refills: 0 | Status: SHIPPED | OUTPATIENT
Start: 2024-11-27

## 2024-11-27 NOTE — PROGRESS NOTES
Here for routine OB visit  Denies VB/ctx/LOF. Active FM  Denies PreE symptoms/UTI symptoms  ROS: doing well, dyspnea with laying flat or on her side, resolve after changing position  She has her dating US from overseas  US done 7/17/24, 13w4d, with ELLEN 1/18/2025. This updated on her chart      General:  NAD, AxO x 3  Abdomen: soft, gravid, non-tender  Ext: peripheral pulses normal, no pedal edema, no clubbing or cyanosis  Skin: normal, good color, and good turgor    Dating changed in chart  @ 13 week  2.  Obesity: on ASA 81 mg  3.  Flu vaccine today/tdap  4.  Discussed weight curve, cut down on carb,   5.  3hr GTT NL,   6.  Anemia: 10.8, on Fe/colace  7.  RSV vaccine today    Strict Labor/bleeding precautions, FKCs    Next visit:  2 weeks

## 2024-11-28 ENCOUNTER — PATIENT MESSAGE (OUTPATIENT)
Dept: OBSTETRICS AND GYNECOLOGY | Facility: CLINIC | Age: 29
End: 2024-11-28
Payer: MEDICAID

## 2024-12-08 ENCOUNTER — PATIENT MESSAGE (OUTPATIENT)
Dept: OBSTETRICS AND GYNECOLOGY | Facility: CLINIC | Age: 29
End: 2024-12-08
Payer: MEDICAID

## 2024-12-10 NOTE — PROGRESS NOTES
Here for routine OB visit  Denies VB/ctx/LOF. Active FM  Denies PreE symptoms/UTI symptoms  ROS: pain from back to lower leg  She has her dating US from [place]  US done 7/17/24, 13w4d, with ELLEN 1/18/2025. This updated on her chart      General:  NAD, AxO x 3  Abdomen: soft, gravid, non-tender  Ext: peripheral pulses normal, no pedal edema, no clubbing or cyanosis  Skin: normal, good color, and good turgor    Dating changed in chart  @ 13 week  2.  Obesity: on ASA 81 mg  3.  Discussed weight curve, cut down on carb,   4.  3hr GTT NL,   5.  Anemia: 10.8, on Fe/colace  7.  Consent today     Strict Labor/bleeding precautions, FKCs    Next visit:  gbs

## 2024-12-11 ENCOUNTER — ROUTINE PRENATAL (OUTPATIENT)
Dept: OBSTETRICS AND GYNECOLOGY | Facility: CLINIC | Age: 29
End: 2024-12-11
Payer: MEDICAID

## 2024-12-11 VITALS
SYSTOLIC BLOOD PRESSURE: 120 MMHG | WEIGHT: 239.19 LBS | BODY MASS INDEX: 38.61 KG/M2 | DIASTOLIC BLOOD PRESSURE: 80 MMHG

## 2024-12-11 DIAGNOSIS — O99.210 OBESITY AFFECTING PREGNANCY, ANTEPARTUM, UNSPECIFIED OBESITY TYPE: Primary | ICD-10-CM

## 2024-12-11 DIAGNOSIS — Z3A.34 34 WEEKS GESTATION OF PREGNANCY: ICD-10-CM

## 2024-12-11 PROCEDURE — 99213 OFFICE O/P EST LOW 20 MIN: CPT | Mod: TH,S$PBB,, | Performed by: OBSTETRICS & GYNECOLOGY

## 2024-12-11 PROCEDURE — 99212 OFFICE O/P EST SF 10 MIN: CPT | Mod: PBBFAC,TH | Performed by: OBSTETRICS & GYNECOLOGY

## 2024-12-11 PROCEDURE — 99999 PR PBB SHADOW E&M-EST. PATIENT-LVL II: CPT | Mod: PBBFAC,,, | Performed by: OBSTETRICS & GYNECOLOGY

## 2024-12-14 ENCOUNTER — PATIENT MESSAGE (OUTPATIENT)
Dept: OTHER | Facility: OTHER | Age: 29
End: 2024-12-14
Payer: MEDICAID

## 2024-12-19 ENCOUNTER — PATIENT MESSAGE (OUTPATIENT)
Dept: OBSTETRICS AND GYNECOLOGY | Facility: CLINIC | Age: 29
End: 2024-12-19
Payer: MEDICAID

## 2024-12-26 ENCOUNTER — ROUTINE PRENATAL (OUTPATIENT)
Dept: OBSTETRICS AND GYNECOLOGY | Facility: CLINIC | Age: 29
End: 2024-12-26
Payer: MEDICAID

## 2024-12-26 VITALS
BODY MASS INDEX: 38.89 KG/M2 | DIASTOLIC BLOOD PRESSURE: 80 MMHG | WEIGHT: 240.94 LBS | SYSTOLIC BLOOD PRESSURE: 118 MMHG

## 2024-12-26 DIAGNOSIS — Z3A.36 36 WEEKS GESTATION OF PREGNANCY: Primary | ICD-10-CM

## 2024-12-26 PROCEDURE — 87653 STREP B DNA AMP PROBE: CPT | Performed by: OBSTETRICS & GYNECOLOGY

## 2024-12-26 PROCEDURE — 99212 OFFICE O/P EST SF 10 MIN: CPT | Mod: PBBFAC | Performed by: OBSTETRICS & GYNECOLOGY

## 2024-12-26 PROCEDURE — 99999 PR PBB SHADOW E&M-EST. PATIENT-LVL II: CPT | Mod: PBBFAC,,, | Performed by: OBSTETRICS & GYNECOLOGY

## 2024-12-26 NOTE — PROGRESS NOTES
Here for routine OB visit  Denies VB/ctx/LOF. Active FM  Denies PreE symptoms/UTI symptoms  ROS: doing well besides pelvic pressure    US done 7/17/24, 13w4d, with ELLEN 1/18/2025. This updated on her chart      General:  NAD, AxO x 3  Abdomen: soft, gravid, non-tender  Ext: peripheral pulses normal, no pedal edema, no clubbing or cyanosis  Skin: normal, good color, and good turgor  Sve: closed/th/-2. Cephalic on US.    Dating changed in chart  @ 13 week  2.  Obesity: on ASA 81 mg  3.  Discussed weight curve, cut down on carb,   4.  3hr GTT NL,   5.  Anemia: 10.8, on Fe/colace  7.  Gbs today    Strict Labor/bleeding precautions, FKCs    Next visit:  1 week

## 2024-12-28 LAB — GROUP B STREPTOCOCCUS, PCR: NEGATIVE

## 2025-01-03 ENCOUNTER — ROUTINE PRENATAL (OUTPATIENT)
Dept: OBSTETRICS AND GYNECOLOGY | Facility: CLINIC | Age: 30
End: 2025-01-03
Payer: MEDICAID

## 2025-01-03 VITALS
WEIGHT: 238.75 LBS | DIASTOLIC BLOOD PRESSURE: 72 MMHG | BODY MASS INDEX: 38.54 KG/M2 | SYSTOLIC BLOOD PRESSURE: 116 MMHG

## 2025-01-03 DIAGNOSIS — O99.210 OBESITY AFFECTING PREGNANCY, ANTEPARTUM, UNSPECIFIED OBESITY TYPE: ICD-10-CM

## 2025-01-03 DIAGNOSIS — Z3A.37 37 WEEKS GESTATION OF PREGNANCY: Primary | ICD-10-CM

## 2025-01-03 PROCEDURE — 99212 OFFICE O/P EST SF 10 MIN: CPT | Mod: PBBFAC,TH

## 2025-01-03 PROCEDURE — 99999 PR PBB SHADOW E&M-EST. PATIENT-LVL II: CPT | Mod: PBBFAC,,,

## 2025-01-03 NOTE — PROGRESS NOTES
37w6d. Pt reports no complaints today. Refused vaginal exam today.   Denies contractions, vaginal bleeding, or leakage of fluid.  Reports adequate fetal movement.      1 hour GTT: FAILED (161), 3 HOUR GTT PASSED  Tdap/ Flu: 2024, RSV 2024  GBS: NEG        /72   Wt 108.3 kg (238 lb 12.1 oz)   LMP 2024   BMI 38.54 kg/m²     29 y.o., at 37w6d by Estimated Date of Delivery: 25  There is no problem list on file for this patient.    OB History    Para Term  AB Living   1             SAB IAB Ectopic Multiple Live Births                  # Outcome Date GA Lbr Víctor/2nd Weight Sex Type Anes PTL Lv   1 Current                Dating reviewed    Allergies and problem list reviewed and updated    Medical and surgical history reviewed    Prenatal labs reviewed and updated    Physical Exam:  ABD: soft, gravid, nontender, 38 cm    Assessment:  Juan Mcdaniels presents today for her CAMILLE visit at 37w6d.    Plan:   1. 37 weeks gestation of pregnancy (Primary)  Doing well    2. Obesity affecting pregnancy, antepartum, unspecified obesity type  Continue ASA as prescribed         F/U 1 week with Dr Peña.  Next visit: routine care   Reviewed warning signs of Labor and Preeclampsia. Bleeding/pain precautions, and kick counts reinforced    BRADY Bolden     I spent a total of 20 minutes on the day of the visit. This includes face to face time and non-face to face time preparing to see the patient (eg, review of tests and charts), Obtaining and/or reviewing separately obtained history, Documenting clinical information in the electronic or other health record, Independently interpreting results and communicating results to the patient/family/caregiver, or Care coordination.

## 2025-01-10 ENCOUNTER — ROUTINE PRENATAL (OUTPATIENT)
Dept: OBSTETRICS AND GYNECOLOGY | Facility: CLINIC | Age: 30
End: 2025-01-10
Payer: MEDICAID

## 2025-01-10 VITALS
BODY MASS INDEX: 39.57 KG/M2 | SYSTOLIC BLOOD PRESSURE: 120 MMHG | WEIGHT: 245.13 LBS | DIASTOLIC BLOOD PRESSURE: 80 MMHG

## 2025-01-10 DIAGNOSIS — O99.210 OBESITY AFFECTING PREGNANCY, ANTEPARTUM, UNSPECIFIED OBESITY TYPE: Primary | ICD-10-CM

## 2025-01-10 DIAGNOSIS — Z3A.38 38 WEEKS GESTATION OF PREGNANCY: ICD-10-CM

## 2025-01-10 PROCEDURE — 99999 PR PBB SHADOW E&M-EST. PATIENT-LVL II: CPT | Mod: PBBFAC,,, | Performed by: OBSTETRICS & GYNECOLOGY

## 2025-01-10 PROCEDURE — 99212 OFFICE O/P EST SF 10 MIN: CPT | Mod: PBBFAC,TH | Performed by: OBSTETRICS & GYNECOLOGY

## 2025-01-10 RX ORDER — SODIUM CHLORIDE, SODIUM LACTATE, POTASSIUM CHLORIDE, CALCIUM CHLORIDE 600; 310; 30; 20 MG/100ML; MG/100ML; MG/100ML; MG/100ML
INJECTION, SOLUTION INTRAVENOUS CONTINUOUS
OUTPATIENT
Start: 2025-01-10

## 2025-01-10 RX ORDER — TERBUTALINE SULFATE 1 MG/ML
0.25 INJECTION SUBCUTANEOUS
OUTPATIENT
Start: 2025-01-10

## 2025-01-10 RX ORDER — METHYLERGONOVINE MALEATE 0.2 MG/ML
200 INJECTION INTRAVENOUS ONCE AS NEEDED
OUTPATIENT
Start: 2025-01-10 | End: 2036-06-08

## 2025-01-10 RX ORDER — OXYTOCIN-SODIUM CHLORIDE 0.9% IV SOLN 30 UNIT/500ML 30-0.9/5 UT/ML-%
10 SOLUTION INTRAVENOUS ONCE AS NEEDED
OUTPATIENT
Start: 2025-01-10 | End: 2036-06-08

## 2025-01-10 RX ORDER — OXYTOCIN/0.9 % SODIUM CHLORIDE 15/250 ML
0-32 PLASTIC BAG, INJECTION (ML) INTRAVENOUS CONTINUOUS
OUTPATIENT
Start: 2025-01-10

## 2025-01-10 RX ORDER — CALCIUM CARBONATE 200(500)MG
500 TABLET,CHEWABLE ORAL 3 TIMES DAILY PRN
OUTPATIENT
Start: 2025-01-10

## 2025-01-10 RX ORDER — MISOPROSTOL 100 MCG
50 TABLET ORAL EVERY 4 HOURS PRN
OUTPATIENT
Start: 2025-01-10

## 2025-01-10 RX ORDER — ACETAMINOPHEN 325 MG/1
650 TABLET ORAL EVERY 6 HOURS PRN
OUTPATIENT
Start: 2025-01-10

## 2025-01-10 RX ORDER — OXYTOCIN/0.9 % SODIUM CHLORIDE 15/250 ML
95 PLASTIC BAG, INJECTION (ML) INTRAVENOUS ONCE AS NEEDED
OUTPATIENT
Start: 2025-01-10 | End: 2036-06-08

## 2025-01-10 RX ORDER — LIDOCAINE HYDROCHLORIDE 10 MG/ML
10 INJECTION, SOLUTION INFILTRATION; PERINEURAL ONCE AS NEEDED
OUTPATIENT
Start: 2025-01-10 | End: 2036-06-08

## 2025-01-10 RX ORDER — MISOPROSTOL 200 UG/1
800 TABLET ORAL ONCE AS NEEDED
OUTPATIENT
Start: 2025-01-10 | End: 2036-06-08

## 2025-01-10 RX ORDER — ONDANSETRON 8 MG/1
8 TABLET, ORALLY DISINTEGRATING ORAL EVERY 8 HOURS PRN
OUTPATIENT
Start: 2025-01-10

## 2025-01-10 RX ORDER — OXYTOCIN 10 [USP'U]/ML
10 INJECTION, SOLUTION INTRAMUSCULAR; INTRAVENOUS ONCE AS NEEDED
OUTPATIENT
Start: 2025-01-10 | End: 2036-06-08

## 2025-01-10 RX ORDER — MISOPROSTOL 200 UG/1
800 TABLET ORAL ONCE AS NEEDED
OUTPATIENT
Start: 2025-01-10

## 2025-01-10 RX ORDER — CARBOPROST TROMETHAMINE 250 UG/ML
250 INJECTION, SOLUTION INTRAMUSCULAR
OUTPATIENT
Start: 2025-01-10

## 2025-01-10 RX ORDER — OXYTOCIN/0.9 % SODIUM CHLORIDE 15/250 ML
95 PLASTIC BAG, INJECTION (ML) INTRAVENOUS CONTINUOUS PRN
OUTPATIENT
Start: 2025-01-10

## 2025-01-10 RX ORDER — MUPIROCIN 20 MG/G
OINTMENT TOPICAL
OUTPATIENT
Start: 2025-01-10

## 2025-01-10 RX ORDER — SODIUM CHLORIDE 9 MG/ML
INJECTION, SOLUTION INTRAVENOUS
OUTPATIENT
Start: 2025-01-10

## 2025-01-10 RX ORDER — SIMETHICONE 80 MG
1 TABLET,CHEWABLE ORAL 4 TIMES DAILY PRN
OUTPATIENT
Start: 2025-01-10

## 2025-01-10 RX ORDER — DIPHENOXYLATE HYDROCHLORIDE AND ATROPINE SULFATE 2.5; .025 MG/1; MG/1
2 TABLET ORAL EVERY 6 HOURS PRN
OUTPATIENT
Start: 2025-01-10

## 2025-01-10 NOTE — PROGRESS NOTES
Here for routine OB visit  Denies VB/ctx/LOF. Active FM  Denies PreE symptoms/UTI symptoms  ROS: doing well besides pelvic pressure  Desires IOL    US done 7/17/24, 13w4d, with ELLEN 1/18/2025. This updated on her chart      General:  NAD, AxO x 3  Abdomen: soft, gravid, non-tender  Ext: peripheral pulses normal, no pedal edema, no clubbing or cyanosis  Skin: normal, good color, and good turgor  Sve: FT/50/-2. Cephalic on US.    Dating changed in chart  @ 13 week  2.  Obesity: on ASA 81 mg  3.  Discussed weight curve, cut down on carb,   4.  3hr GTT NL,   5.  Anemia: 10.8, on Fe/colace  7.  Gbs neg  8.  IOL next week Monday 1/13 @ 12pm cytotec    Strict Labor/bleeding precautions, FKCs    Next visit:  1 week

## 2025-01-15 ENCOUNTER — HOSPITAL ENCOUNTER (OUTPATIENT)
Facility: HOSPITAL | Age: 30
Discharge: HOME | End: 2025-01-16
Attending: OBSTETRICS & GYNECOLOGY | Admitting: OBSTETRICS & GYNECOLOGY
Payer: MEDICAID

## 2025-01-15 DIAGNOSIS — O99.210 OBESITY AFFECTING PREGNANCY, ANTEPARTUM, UNSPECIFIED OBESITY TYPE: ICD-10-CM

## 2025-01-15 LAB
ABO + RH BLD: NORMAL
ALBUMIN SERPL BCP-MCNC: 3.1 G/DL (ref 3.5–5.2)
ALP SERPL-CCNC: 132 U/L (ref 40–150)
ALT SERPL W/O P-5'-P-CCNC: 21 U/L (ref 10–44)
ANION GAP SERPL CALC-SCNC: 9 MMOL/L (ref 8–16)
AST SERPL-CCNC: 24 U/L (ref 10–40)
BASOPHILS # BLD AUTO: 0.02 K/UL (ref 0–0.2)
BASOPHILS NFR BLD: 0.3 % (ref 0–1.9)
BILIRUB SERPL-MCNC: 0.3 MG/DL (ref 0.1–1)
BLD GP AB SCN CELLS X3 SERPL QL: NORMAL
BUN SERPL-MCNC: 7 MG/DL (ref 6–20)
CALCIUM SERPL-MCNC: 9 MG/DL (ref 8.7–10.5)
CHLORIDE SERPL-SCNC: 104 MMOL/L (ref 95–110)
CO2 SERPL-SCNC: 21 MMOL/L (ref 23–29)
CREAT SERPL-MCNC: 0.6 MG/DL (ref 0.5–1.4)
DIFFERENTIAL METHOD BLD: ABNORMAL
EOSINOPHIL # BLD AUTO: 0 K/UL (ref 0–0.5)
EOSINOPHIL NFR BLD: 0.5 % (ref 0–8)
ERYTHROCYTE [DISTWIDTH] IN BLOOD BY AUTOMATED COUNT: 13.4 % (ref 11.5–14.5)
EST. GFR  (NO RACE VARIABLE): >60 ML/MIN/1.73 M^2
GLUCOSE SERPL-MCNC: 84 MG/DL (ref 70–110)
HBV SURFACE AG SERPL QL IA: NORMAL
HCT VFR BLD AUTO: 38.6 % (ref 37–48.5)
HGB BLD-MCNC: 13 G/DL (ref 12–16)
HIV 1+2 AB+HIV1 P24 AG SERPL QL IA: NORMAL
IMM GRANULOCYTES # BLD AUTO: 0.06 K/UL (ref 0–0.04)
IMM GRANULOCYTES NFR BLD AUTO: 1 % (ref 0–0.5)
LYMPHOCYTES # BLD AUTO: 1.9 K/UL (ref 1–4.8)
LYMPHOCYTES NFR BLD: 31.4 % (ref 18–48)
MCH RBC QN AUTO: 28.7 PG (ref 27–31)
MCHC RBC AUTO-ENTMCNC: 33.7 G/DL (ref 32–36)
MCV RBC AUTO: 85 FL (ref 82–98)
MONOCYTES # BLD AUTO: 0.5 K/UL (ref 0.3–1)
MONOCYTES NFR BLD: 8.7 % (ref 4–15)
NEUTROPHILS # BLD AUTO: 3.5 K/UL (ref 1.8–7.7)
NEUTROPHILS NFR BLD: 58.1 % (ref 38–73)
NRBC BLD-RTO: 0 /100 WBC
PLATELET # BLD AUTO: 193 K/UL (ref 150–450)
PMV BLD AUTO: 10.5 FL (ref 9.2–12.9)
POTASSIUM SERPL-SCNC: 3.8 MMOL/L (ref 3.5–5.1)
PROT SERPL-MCNC: 7.2 G/DL (ref 6–8.4)
RBC # BLD AUTO: 4.53 M/UL (ref 4–5.4)
SODIUM SERPL-SCNC: 134 MMOL/L (ref 136–145)
TREPONEMA PALLIDUM IGG+IGM AB [PRESENCE] IN SERUM OR PLASMA BY IMMUNOASSAY: NONREACTIVE
WBC # BLD AUTO: 5.99 K/UL (ref 3.9–12.7)

## 2025-01-15 PROCEDURE — 87389 HIV-1 AG W/HIV-1&-2 AB AG IA: CPT | Performed by: OBSTETRICS & GYNECOLOGY

## 2025-01-15 PROCEDURE — 86850 RBC ANTIBODY SCREEN: CPT | Performed by: OBSTETRICS & GYNECOLOGY

## 2025-01-15 PROCEDURE — 72100002 HC LABOR CARE, 1ST 8 HOURS

## 2025-01-15 PROCEDURE — 25000003 PHARM REV CODE 250: Performed by: OBSTETRICS & GYNECOLOGY

## 2025-01-15 PROCEDURE — 11000001 HC ACUTE MED/SURG PRIVATE ROOM

## 2025-01-15 PROCEDURE — 63600175 PHARM REV CODE 636 W HCPCS: Performed by: OBSTETRICS & GYNECOLOGY

## 2025-01-15 PROCEDURE — 59025 FETAL NON-STRESS TEST: CPT

## 2025-01-15 PROCEDURE — 72100003 HC LABOR CARE, EA. ADDL. 8 HRS

## 2025-01-15 PROCEDURE — 96360 HYDRATION IV INFUSION INIT: CPT

## 2025-01-15 PROCEDURE — 87340 HEPATITIS B SURFACE AG IA: CPT | Performed by: OBSTETRICS & GYNECOLOGY

## 2025-01-15 PROCEDURE — 86593 SYPHILIS TEST NON-TREP QUANT: CPT | Performed by: OBSTETRICS & GYNECOLOGY

## 2025-01-15 PROCEDURE — 96361 HYDRATE IV INFUSION ADD-ON: CPT

## 2025-01-15 PROCEDURE — 80053 COMPREHEN METABOLIC PANEL: CPT | Performed by: OBSTETRICS & GYNECOLOGY

## 2025-01-15 PROCEDURE — 85025 COMPLETE CBC W/AUTO DIFF WBC: CPT | Performed by: OBSTETRICS & GYNECOLOGY

## 2025-01-15 PROCEDURE — G0378 HOSPITAL OBSERVATION PER HR: HCPCS

## 2025-01-15 RX ORDER — DIPHENOXYLATE HYDROCHLORIDE AND ATROPINE SULFATE 2.5; .025 MG/1; MG/1
2 TABLET ORAL EVERY 6 HOURS PRN
Status: DISCONTINUED | OUTPATIENT
Start: 2025-01-15 | End: 2025-01-16 | Stop reason: HOSPADM

## 2025-01-15 RX ORDER — OXYTOCIN/0.9 % SODIUM CHLORIDE 15/250 ML
95 PLASTIC BAG, INJECTION (ML) INTRAVENOUS CONTINUOUS PRN
Status: DISCONTINUED | OUTPATIENT
Start: 2025-01-15 | End: 2025-01-16 | Stop reason: HOSPADM

## 2025-01-15 RX ORDER — ACETAMINOPHEN 325 MG/1
650 TABLET ORAL EVERY 6 HOURS PRN
Status: DISCONTINUED | OUTPATIENT
Start: 2025-01-15 | End: 2025-01-16 | Stop reason: HOSPADM

## 2025-01-15 RX ORDER — LIDOCAINE HYDROCHLORIDE 10 MG/ML
10 INJECTION, SOLUTION INFILTRATION; PERINEURAL ONCE AS NEEDED
Status: DISCONTINUED | OUTPATIENT
Start: 2025-01-15 | End: 2025-01-16 | Stop reason: HOSPADM

## 2025-01-15 RX ORDER — MISOPROSTOL 100 MCG
50 TABLET ORAL EVERY 4 HOURS PRN
Status: DISCONTINUED | OUTPATIENT
Start: 2025-01-15 | End: 2025-01-16 | Stop reason: HOSPADM

## 2025-01-15 RX ORDER — SIMETHICONE 80 MG
1 TABLET,CHEWABLE ORAL 4 TIMES DAILY PRN
Status: DISCONTINUED | OUTPATIENT
Start: 2025-01-15 | End: 2025-01-16 | Stop reason: HOSPADM

## 2025-01-15 RX ORDER — OXYTOCIN/0.9 % SODIUM CHLORIDE 15/250 ML
95 PLASTIC BAG, INJECTION (ML) INTRAVENOUS ONCE AS NEEDED
Status: DISCONTINUED | OUTPATIENT
Start: 2025-01-15 | End: 2025-01-16 | Stop reason: HOSPADM

## 2025-01-15 RX ORDER — OXYTOCIN-SODIUM CHLORIDE 0.9% IV SOLN 30 UNIT/500ML 30-0.9/5 UT/ML-%
10 SOLUTION INTRAVENOUS ONCE AS NEEDED
Status: DISCONTINUED | OUTPATIENT
Start: 2025-01-15 | End: 2025-01-16 | Stop reason: HOSPADM

## 2025-01-15 RX ORDER — SODIUM CHLORIDE, SODIUM LACTATE, POTASSIUM CHLORIDE, CALCIUM CHLORIDE 600; 310; 30; 20 MG/100ML; MG/100ML; MG/100ML; MG/100ML
INJECTION, SOLUTION INTRAVENOUS CONTINUOUS
Status: DISCONTINUED | OUTPATIENT
Start: 2025-01-15 | End: 2025-01-16 | Stop reason: HOSPADM

## 2025-01-15 RX ORDER — CALCIUM CARBONATE 200(500)MG
500 TABLET,CHEWABLE ORAL 3 TIMES DAILY PRN
Status: DISCONTINUED | OUTPATIENT
Start: 2025-01-15 | End: 2025-01-16 | Stop reason: HOSPADM

## 2025-01-15 RX ORDER — MUPIROCIN 20 MG/G
OINTMENT TOPICAL
Status: DISCONTINUED | OUTPATIENT
Start: 2025-01-15 | End: 2025-01-16 | Stop reason: HOSPADM

## 2025-01-15 RX ORDER — METHYLERGONOVINE MALEATE 0.2 MG/ML
200 INJECTION INTRAVENOUS ONCE AS NEEDED
Status: DISCONTINUED | OUTPATIENT
Start: 2025-01-15 | End: 2025-01-16 | Stop reason: HOSPADM

## 2025-01-15 RX ORDER — CARBOPROST TROMETHAMINE 250 UG/ML
250 INJECTION, SOLUTION INTRAMUSCULAR
Status: DISCONTINUED | OUTPATIENT
Start: 2025-01-15 | End: 2025-01-16 | Stop reason: HOSPADM

## 2025-01-15 RX ORDER — MISOPROSTOL 200 UG/1
800 TABLET ORAL ONCE AS NEEDED
Status: DISCONTINUED | OUTPATIENT
Start: 2025-01-15 | End: 2025-01-16 | Stop reason: HOSPADM

## 2025-01-15 RX ORDER — BUTALBITAL, ACETAMINOPHEN AND CAFFEINE 50; 325; 40 MG/1; MG/1; MG/1
1 TABLET ORAL ONCE
Status: COMPLETED | OUTPATIENT
Start: 2025-01-15 | End: 2025-01-15

## 2025-01-15 RX ORDER — ONDANSETRON 8 MG/1
8 TABLET, ORALLY DISINTEGRATING ORAL EVERY 8 HOURS PRN
Status: DISCONTINUED | OUTPATIENT
Start: 2025-01-15 | End: 2025-01-16 | Stop reason: HOSPADM

## 2025-01-15 RX ORDER — OXYTOCIN 10 [USP'U]/ML
10 INJECTION, SOLUTION INTRAMUSCULAR; INTRAVENOUS ONCE AS NEEDED
Status: DISCONTINUED | OUTPATIENT
Start: 2025-01-15 | End: 2025-01-16 | Stop reason: HOSPADM

## 2025-01-15 RX ORDER — SODIUM CHLORIDE 9 MG/ML
INJECTION, SOLUTION INTRAVENOUS
Status: DISCONTINUED | OUTPATIENT
Start: 2025-01-15 | End: 2025-01-16 | Stop reason: HOSPADM

## 2025-01-15 RX ORDER — TERBUTALINE SULFATE 1 MG/ML
0.25 INJECTION SUBCUTANEOUS
Status: DISCONTINUED | OUTPATIENT
Start: 2025-01-15 | End: 2025-01-16 | Stop reason: HOSPADM

## 2025-01-15 RX ORDER — OXYTOCIN/0.9 % SODIUM CHLORIDE 15/250 ML
0-32 PLASTIC BAG, INJECTION (ML) INTRAVENOUS CONTINUOUS
Status: DISCONTINUED | OUTPATIENT
Start: 2025-01-15 | End: 2025-01-16 | Stop reason: HOSPADM

## 2025-01-15 RX ORDER — TRANEXAMIC ACID 10 MG/ML
1000 INJECTION, SOLUTION INTRAVENOUS EVERY 30 MIN PRN
Status: DISCONTINUED | OUTPATIENT
Start: 2025-01-15 | End: 2025-01-16 | Stop reason: HOSPADM

## 2025-01-15 RX ADMIN — MISOPROSTOL 50 MCG: 100 TABLET ORAL at 06:01

## 2025-01-15 RX ADMIN — BUTALBITAL, ACETAMINOPHEN, AND CAFFEINE 1 TABLET: 325; 50; 40 TABLET ORAL at 10:01

## 2025-01-15 RX ADMIN — SODIUM CHLORIDE, POTASSIUM CHLORIDE, SODIUM LACTATE AND CALCIUM CHLORIDE: 600; 310; 30; 20 INJECTION, SOLUTION INTRAVENOUS at 07:01

## 2025-01-15 RX ADMIN — ACETAMINOPHEN 650 MG: 325 TABLET ORAL at 08:01

## 2025-01-15 RX ADMIN — MISOPROSTOL 50 MCG: 100 TABLET ORAL at 01:01

## 2025-01-15 RX ADMIN — MISOPROSTOL 50 MCG: 100 TABLET ORAL at 09:01

## 2025-01-15 RX ADMIN — SODIUM CHLORIDE, SODIUM LACTATE, POTASSIUM CHLORIDE, AND CALCIUM CHLORIDE 1000 ML: .6; .31; .03; .02 INJECTION, SOLUTION INTRAVENOUS at 02:01

## 2025-01-15 RX ADMIN — MISOPROSTOL 50 MCG: 100 TABLET ORAL at 10:01

## 2025-01-15 NOTE — NURSING
Phone call to MD Peña. Updated on progress and FHR status. Strip reviewed by MD Muñiz, MD Muñiz currently on unit. Orders to proceed with next dose of cytotec as planned by Dr. Peña. POC reviewed with patient.

## 2025-01-15 NOTE — NURSING
Pt arrived for scheduled induction. NAD noted. Pt oriented to room and bathroom and instructed to take Hibiclens shower. Pt updated on POC. Instructed to use call bell when finished. Pt verbalized understanding.

## 2025-01-16 VITALS
HEART RATE: 91 BPM | BODY MASS INDEX: 39.4 KG/M2 | OXYGEN SATURATION: 99 % | DIASTOLIC BLOOD PRESSURE: 80 MMHG | WEIGHT: 245.13 LBS | HEIGHT: 66 IN | TEMPERATURE: 98 F | SYSTOLIC BLOOD PRESSURE: 123 MMHG | RESPIRATION RATE: 20 BRPM

## 2025-01-16 PROBLEM — O99.210 OBESITY AFFECTING PREGNANCY, ANTEPARTUM: Status: ACTIVE | Noted: 2025-01-16

## 2025-01-16 PROCEDURE — 25000003 PHARM REV CODE 250: Performed by: OBSTETRICS & GYNECOLOGY

## 2025-01-16 PROCEDURE — 96361 HYDRATE IV INFUSION ADD-ON: CPT

## 2025-01-16 PROCEDURE — G0378 HOSPITAL OBSERVATION PER HR: HCPCS

## 2025-01-16 PROCEDURE — 72100003 HC LABOR CARE, EA. ADDL. 8 HRS

## 2025-01-16 PROCEDURE — 63600175 PHARM REV CODE 636 W HCPCS: Performed by: OBSTETRICS & GYNECOLOGY

## 2025-01-16 RX ADMIN — MISOPROSTOL 50 MCG: 100 TABLET ORAL at 02:01

## 2025-01-16 RX ADMIN — SODIUM CHLORIDE, POTASSIUM CHLORIDE, SODIUM LACTATE AND CALCIUM CHLORIDE: 600; 310; 30; 20 INJECTION, SOLUTION INTRAVENOUS at 02:01

## 2025-01-16 NOTE — NURSING
2142: Dr. Peña called to get update on patient, provided information on patient, received order to give patient next dose of cytotec and start high dose pitocin after 2 cm dilation. Also informed MD about the headache that patient was complaining about, received order to administer Fioricet.

## 2025-01-16 NOTE — NURSING
MD Peña at bedside for SVE, cervix unchanged. Pt requesting to be discharged and come back when more favorable. MD Peña okay with pt request. Will scheduled pt for induction on Monday 1/20 @12 pm.

## 2025-01-16 NOTE — NURSING
0629: Called Dr. Peña to give report on same SVE, received order to start pitocin and get ready for aldana balloon. MD will be in unit for AROM.

## 2025-01-16 NOTE — DISCHARGE INSTRUCTIONS
Home Undelivered Discharge Instructions    After Discharge Orders:    Future Appointments   Date Time Provider Department Center   1/16/2025 10:30 AM Jannet Peña MD [facility]   1/24/2025 11:15 AM Jannet Peña MD [facility]         Current Discharge Medication List        CONTINUE these medications which have NOT CHANGED    Details   PNV,calcium 72/iron/folic acid (PRENATAL VITAMIN) Tab Take 1 tablet by mouth once daily.  Qty: 90 tablet, Refills: 3    Comments: Please substitute to comparable prenatal vitamins if not covered by pt insurance plan or per pt request.  Associated Diagnoses: Positive pregnancy test      aspirin (ECOTRIN) 81 MG EC tablet Take 1 tablet (81 mg total) by mouth once daily.  Qty: 90 tablet, Refills: 2      !! docusate sodium (COLACE) 100 MG capsule Take 1 capsule (100 mg total) by mouth 2 (two) times daily.  Qty: 60 capsule, Refills: 0      !! docusate sodium (COLACE) 100 MG capsule Take 1 capsule (100 mg total) by mouth 2 (two) times daily.  Qty: 60 capsule, Refills: 11      ferrous sulfate 325 (65 FE) MG EC tablet Take 1 tablet (325 mg total) by mouth once daily.  Qty: 90 tablet, Refills: 2      ondansetron (ZOFRAN-ODT) 4 MG TbDL Take 1 tablet (4 mg total) by mouth every 6 (six) hours as needed.  Qty: 30 tablet, Refills: 1    Associated Diagnoses: Nausea and vomiting during pregnancy      RSV, preF A and preF B,PF, (ABRYSVO, PF,) 120 mcg/0.5 mL SolR vaccine Abrysvo only, pt is pregnant  Qty: 0.5 mL, Refills: 0       !! - Potential duplicate medications found. Please discuss with provider.                  Diet:  normal diet as tolerated    Rest: normal activity as tolerated    Other instructions: Do kick counts once a day on your baby. Choose the time of day your baby is most active. Get in a comfortable lying or sitting position and time how long it takes to feel 10 kicks, twists, turns, swishes, or rolls. Call your physician or midwife if there have  not been 10 kicks in 2 hours    Call physician or midwife, return to Labor and Delivery, call 911, or go to the nearest Emergency Room if: increased leakage or fluid, contractions more than  4 per  45 minutes, decreased fetal movement, persistent low back pain or cramping, bleeding from vaginal area, difficulty urinating, pain with urination, difficulty breathing, new calf pain, persistent headache, or vision change

## 2025-01-17 ENCOUNTER — PATIENT MESSAGE (OUTPATIENT)
Dept: OBSTETRICS AND GYNECOLOGY | Facility: CLINIC | Age: 30
End: 2025-01-17
Payer: MEDICAID

## 2025-01-18 ENCOUNTER — PATIENT MESSAGE (OUTPATIENT)
Dept: OBSTETRICS AND GYNECOLOGY | Facility: CLINIC | Age: 30
End: 2025-01-18
Payer: MEDICAID

## 2025-01-19 ENCOUNTER — HOSPITAL ENCOUNTER (INPATIENT)
Facility: HOSPITAL | Age: 30
LOS: 3 days | Discharge: HOME | End: 2025-01-22
Attending: OBSTETRICS & GYNECOLOGY | Admitting: OBSTETRICS & GYNECOLOGY
Payer: MEDICAID

## 2025-01-19 DIAGNOSIS — O99.210 OBESITY AFFECTING PREGNANCY, ANTEPARTUM: ICD-10-CM

## 2025-01-19 DIAGNOSIS — Z34.90 PREGNANCY: ICD-10-CM

## 2025-01-19 LAB
ABO + RH BLD: NORMAL
ALBUMIN SERPL BCP-MCNC: 3.1 G/DL (ref 3.5–5.2)
ALP SERPL-CCNC: 142 U/L (ref 40–150)
ALT SERPL W/O P-5'-P-CCNC: 19 U/L (ref 10–44)
ANION GAP SERPL CALC-SCNC: 12 MMOL/L (ref 8–16)
AST SERPL-CCNC: 17 U/L (ref 10–40)
BASOPHILS # BLD AUTO: 0.02 K/UL (ref 0–0.2)
BASOPHILS NFR BLD: 0.3 % (ref 0–1.9)
BILIRUB SERPL-MCNC: 0.2 MG/DL (ref 0.1–1)
BLD GP AB SCN CELLS X3 SERPL QL: NORMAL
BUN SERPL-MCNC: 8 MG/DL (ref 6–20)
CALCIUM SERPL-MCNC: 9.6 MG/DL (ref 8.7–10.5)
CHLORIDE SERPL-SCNC: 106 MMOL/L (ref 95–110)
CO2 SERPL-SCNC: 20 MMOL/L (ref 23–29)
CREAT SERPL-MCNC: 0.6 MG/DL (ref 0.5–1.4)
DIFFERENTIAL METHOD BLD: ABNORMAL
EOSINOPHIL # BLD AUTO: 0 K/UL (ref 0–0.5)
EOSINOPHIL NFR BLD: 0.6 % (ref 0–8)
ERYTHROCYTE [DISTWIDTH] IN BLOOD BY AUTOMATED COUNT: 13.4 % (ref 11.5–14.5)
EST. GFR  (NO RACE VARIABLE): >60 ML/MIN/1.73 M^2
GLUCOSE SERPL-MCNC: 110 MG/DL (ref 70–110)
HCT VFR BLD AUTO: 36.5 % (ref 37–48.5)
HGB BLD-MCNC: 12.4 G/DL (ref 12–16)
IMM GRANULOCYTES # BLD AUTO: 0.07 K/UL (ref 0–0.04)
IMM GRANULOCYTES NFR BLD AUTO: 1 % (ref 0–0.5)
LYMPHOCYTES # BLD AUTO: 2.2 K/UL (ref 1–4.8)
LYMPHOCYTES NFR BLD: 31.6 % (ref 18–48)
MCH RBC QN AUTO: 28.9 PG (ref 27–31)
MCHC RBC AUTO-ENTMCNC: 34 G/DL (ref 32–36)
MCV RBC AUTO: 85 FL (ref 82–98)
MONOCYTES # BLD AUTO: 0.5 K/UL (ref 0.3–1)
MONOCYTES NFR BLD: 7.1 % (ref 4–15)
NEUTROPHILS # BLD AUTO: 4.1 K/UL (ref 1.8–7.7)
NEUTROPHILS NFR BLD: 59.4 % (ref 38–73)
NRBC BLD-RTO: 0 /100 WBC
PLATELET # BLD AUTO: 189 K/UL (ref 150–450)
PMV BLD AUTO: 10.5 FL (ref 9.2–12.9)
POTASSIUM SERPL-SCNC: 3.9 MMOL/L (ref 3.5–5.1)
PROT SERPL-MCNC: 7.1 G/DL (ref 6–8.4)
RBC # BLD AUTO: 4.29 M/UL (ref 4–5.4)
SODIUM SERPL-SCNC: 138 MMOL/L (ref 136–145)
WBC # BLD AUTO: 6.9 K/UL (ref 3.9–12.7)

## 2025-01-19 PROCEDURE — 72100002 HC LABOR CARE, 1ST 8 HOURS

## 2025-01-19 PROCEDURE — 99211 OFF/OP EST MAY X REQ PHY/QHP: CPT | Mod: 25

## 2025-01-19 PROCEDURE — 86850 RBC ANTIBODY SCREEN: CPT | Performed by: OBSTETRICS & GYNECOLOGY

## 2025-01-19 PROCEDURE — 25000003 PHARM REV CODE 250: Performed by: OBSTETRICS & GYNECOLOGY

## 2025-01-19 PROCEDURE — 11000001 HC ACUTE MED/SURG PRIVATE ROOM

## 2025-01-19 PROCEDURE — 59025 FETAL NON-STRESS TEST: CPT

## 2025-01-19 PROCEDURE — 85025 COMPLETE CBC W/AUTO DIFF WBC: CPT | Performed by: OBSTETRICS & GYNECOLOGY

## 2025-01-19 PROCEDURE — 80053 COMPREHEN METABOLIC PANEL: CPT | Performed by: OBSTETRICS & GYNECOLOGY

## 2025-01-19 PROCEDURE — 63600175 PHARM REV CODE 636 W HCPCS: Performed by: OBSTETRICS & GYNECOLOGY

## 2025-01-19 RX ORDER — MUPIROCIN 20 MG/G
OINTMENT TOPICAL
Status: DISCONTINUED | OUTPATIENT
Start: 2025-01-19 | End: 2025-01-22 | Stop reason: HOSPADM

## 2025-01-19 RX ORDER — OXYTOCIN-SODIUM CHLORIDE 0.9% IV SOLN 30 UNIT/500ML 30-0.9/5 UT/ML-%
95 SOLUTION INTRAVENOUS ONCE AS NEEDED
Status: DISCONTINUED | OUTPATIENT
Start: 2025-01-19 | End: 2025-01-20

## 2025-01-19 RX ORDER — LIDOCAINE HYDROCHLORIDE 10 MG/ML
10 INJECTION, SOLUTION INFILTRATION; PERINEURAL ONCE AS NEEDED
Status: DISCONTINUED | OUTPATIENT
Start: 2025-01-19 | End: 2025-01-22 | Stop reason: HOSPADM

## 2025-01-19 RX ORDER — OXYTOCIN-SODIUM CHLORIDE 0.9% IV SOLN 30 UNIT/500ML 30-0.9/5 UT/ML-%
10 SOLUTION INTRAVENOUS ONCE AS NEEDED
Status: DISCONTINUED | OUTPATIENT
Start: 2025-01-19 | End: 2025-01-20

## 2025-01-19 RX ORDER — ONDANSETRON 8 MG/1
8 TABLET, ORALLY DISINTEGRATING ORAL EVERY 8 HOURS PRN
Status: DISCONTINUED | OUTPATIENT
Start: 2025-01-19 | End: 2025-01-22 | Stop reason: HOSPADM

## 2025-01-19 RX ORDER — MISOPROSTOL 200 UG/1
800 TABLET ORAL ONCE AS NEEDED
Status: DISCONTINUED | OUTPATIENT
Start: 2025-01-19 | End: 2025-01-20

## 2025-01-19 RX ORDER — CALCIUM CARBONATE 200(500)MG
500 TABLET,CHEWABLE ORAL 3 TIMES DAILY PRN
Status: DISCONTINUED | OUTPATIENT
Start: 2025-01-19 | End: 2025-01-22 | Stop reason: HOSPADM

## 2025-01-19 RX ORDER — OXYTOCIN-SODIUM CHLORIDE 0.9% IV SOLN 30 UNIT/500ML 30-0.9/5 UT/ML-%
95 SOLUTION INTRAVENOUS CONTINUOUS PRN
Status: DISCONTINUED | OUTPATIENT
Start: 2025-01-19 | End: 2025-01-20

## 2025-01-19 RX ORDER — METHYLERGONOVINE MALEATE 0.2 MG/ML
200 INJECTION INTRAVENOUS ONCE AS NEEDED
Status: DISCONTINUED | OUTPATIENT
Start: 2025-01-19 | End: 2025-01-20

## 2025-01-19 RX ORDER — SIMETHICONE 80 MG
1 TABLET,CHEWABLE ORAL 4 TIMES DAILY PRN
Status: DISCONTINUED | OUTPATIENT
Start: 2025-01-19 | End: 2025-01-22 | Stop reason: HOSPADM

## 2025-01-19 RX ORDER — MISOPROSTOL 100 MCG
50 TABLET ORAL EVERY 4 HOURS PRN
Status: DISCONTINUED | OUTPATIENT
Start: 2025-01-19 | End: 2025-01-20

## 2025-01-19 RX ORDER — MISOPROSTOL 200 UG/1
800 TABLET ORAL ONCE AS NEEDED
Status: COMPLETED | OUTPATIENT
Start: 2025-01-19 | End: 2025-01-20

## 2025-01-19 RX ORDER — DIPHENOXYLATE HYDROCHLORIDE AND ATROPINE SULFATE 2.5; .025 MG/1; MG/1
2 TABLET ORAL EVERY 6 HOURS PRN
Status: DISCONTINUED | OUTPATIENT
Start: 2025-01-19 | End: 2025-01-20

## 2025-01-19 RX ORDER — SODIUM CHLORIDE 9 MG/ML
INJECTION, SOLUTION INTRAVENOUS
Status: DISCONTINUED | OUTPATIENT
Start: 2025-01-19 | End: 2025-01-20

## 2025-01-19 RX ORDER — OXYTOCIN 10 [USP'U]/ML
10 INJECTION, SOLUTION INTRAMUSCULAR; INTRAVENOUS ONCE AS NEEDED
Status: DISCONTINUED | OUTPATIENT
Start: 2025-01-19 | End: 2025-01-22 | Stop reason: HOSPADM

## 2025-01-19 RX ORDER — CARBOPROST TROMETHAMINE 250 UG/ML
250 INJECTION, SOLUTION INTRAMUSCULAR
Status: DISCONTINUED | OUTPATIENT
Start: 2025-01-19 | End: 2025-01-22 | Stop reason: HOSPADM

## 2025-01-19 RX ORDER — TRANEXAMIC ACID 10 MG/ML
1000 INJECTION, SOLUTION INTRAVENOUS EVERY 30 MIN PRN
Status: DISCONTINUED | OUTPATIENT
Start: 2025-01-19 | End: 2025-01-22 | Stop reason: HOSPADM

## 2025-01-19 RX ORDER — SODIUM CHLORIDE, SODIUM LACTATE, POTASSIUM CHLORIDE, CALCIUM CHLORIDE 600; 310; 30; 20 MG/100ML; MG/100ML; MG/100ML; MG/100ML
INJECTION, SOLUTION INTRAVENOUS CONTINUOUS
Status: DISCONTINUED | OUTPATIENT
Start: 2025-01-19 | End: 2025-01-20

## 2025-01-19 RX ADMIN — SODIUM CHLORIDE, POTASSIUM CHLORIDE, SODIUM LACTATE AND CALCIUM CHLORIDE: 600; 310; 30; 20 INJECTION, SOLUTION INTRAVENOUS at 09:01

## 2025-01-19 RX ADMIN — MISOPROSTOL 50 MCG: 100 TABLET ORAL at 10:01

## 2025-01-20 ENCOUNTER — ANESTHESIA (OUTPATIENT)
Dept: OBSTETRICS AND GYNECOLOGY | Facility: HOSPITAL | Age: 30
End: 2025-01-20
Payer: MEDICAID

## 2025-01-20 ENCOUNTER — ANESTHESIA EVENT (OUTPATIENT)
Dept: OBSTETRICS AND GYNECOLOGY | Facility: HOSPITAL | Age: 30
End: 2025-01-20
Payer: MEDICAID

## 2025-01-20 PROCEDURE — 63600175 PHARM REV CODE 636 W HCPCS: Performed by: ANESTHESIOLOGY

## 2025-01-20 PROCEDURE — 63600175 PHARM REV CODE 636 W HCPCS: Performed by: OBSTETRICS & GYNECOLOGY

## 2025-01-20 PROCEDURE — 59409 OBSTETRICAL CARE: CPT | Mod: AA,,, | Performed by: ANESTHESIOLOGY

## 2025-01-20 PROCEDURE — 27200710 HC EPIDURAL INFUSION PUMP SET: Performed by: ANESTHESIOLOGY

## 2025-01-20 PROCEDURE — C1751 CATH, INF, PER/CENT/MIDLINE: HCPCS | Performed by: ANESTHESIOLOGY

## 2025-01-20 PROCEDURE — 51702 INSERT TEMP BLADDER CATH: CPT

## 2025-01-20 PROCEDURE — 11000001 HC ACUTE MED/SURG PRIVATE ROOM

## 2025-01-20 PROCEDURE — 72100003 HC LABOR CARE, EA. ADDL. 8 HRS

## 2025-01-20 PROCEDURE — 62326 NJX INTERLAMINAR LMBR/SAC: CPT | Performed by: ANESTHESIOLOGY

## 2025-01-20 PROCEDURE — 25000003 PHARM REV CODE 250: Performed by: ANESTHESIOLOGY

## 2025-01-20 PROCEDURE — 59409 OBSTETRICAL CARE: CPT | Mod: GB,,, | Performed by: OBSTETRICS & GYNECOLOGY

## 2025-01-20 PROCEDURE — 25000003 PHARM REV CODE 250: Performed by: OBSTETRICS & GYNECOLOGY

## 2025-01-20 PROCEDURE — 0KQM0ZZ REPAIR PERINEUM MUSCLE, OPEN APPROACH: ICD-10-PCS | Performed by: OBSTETRICS & GYNECOLOGY

## 2025-01-20 RX ORDER — TRANEXAMIC ACID 10 MG/ML
1000 INJECTION, SOLUTION INTRAVENOUS EVERY 30 MIN PRN
Status: DISCONTINUED | OUTPATIENT
Start: 2025-01-20 | End: 2025-01-22 | Stop reason: HOSPADM

## 2025-01-20 RX ORDER — DIPHENHYDRAMINE HYDROCHLORIDE 50 MG/ML
25 INJECTION INTRAMUSCULAR; INTRAVENOUS EVERY 4 HOURS PRN
Status: DISCONTINUED | OUTPATIENT
Start: 2025-01-20 | End: 2025-01-22 | Stop reason: HOSPADM

## 2025-01-20 RX ORDER — SODIUM CHLORIDE 0.9 % (FLUSH) 0.9 %
10 SYRINGE (ML) INJECTION
Status: DISCONTINUED | OUTPATIENT
Start: 2025-01-20 | End: 2025-01-22 | Stop reason: HOSPADM

## 2025-01-20 RX ORDER — ACETAMINOPHEN AND CODEINE PHOSPHATE 300; 30 MG/1; MG/1
2 TABLET ORAL EVERY 4 HOURS PRN
Status: DISCONTINUED | OUTPATIENT
Start: 2025-01-20 | End: 2025-01-22 | Stop reason: HOSPADM

## 2025-01-20 RX ORDER — FENTANYL/BUPIVACAINE/NS/PF 2MCG/ML-.1
PLASTIC BAG, INJECTION (ML) INJECTION CONTINUOUS PRN
Status: DISCONTINUED | OUTPATIENT
Start: 2025-01-20 | End: 2025-01-20

## 2025-01-20 RX ORDER — LIDOCAINE HYDROCHLORIDE AND EPINEPHRINE 10; 10 UG/ML; MG/ML
INJECTION, SOLUTION INFILTRATION; PERINEURAL
Status: DISCONTINUED | OUTPATIENT
Start: 2025-01-20 | End: 2025-01-20

## 2025-01-20 RX ORDER — OXYTOCIN-SODIUM CHLORIDE 0.9% IV SOLN 30 UNIT/500ML 30-0.9/5 UT/ML-%
0-32 SOLUTION INTRAVENOUS CONTINUOUS
Status: DISCONTINUED | OUTPATIENT
Start: 2025-01-20 | End: 2025-01-20

## 2025-01-20 RX ORDER — OXYTOCIN-SODIUM CHLORIDE 0.9% IV SOLN 30 UNIT/500ML 30-0.9/5 UT/ML-%
95 SOLUTION INTRAVENOUS CONTINUOUS PRN
Status: DISCONTINUED | OUTPATIENT
Start: 2025-01-20 | End: 2025-01-22 | Stop reason: HOSPADM

## 2025-01-20 RX ORDER — OXYTOCIN-SODIUM CHLORIDE 0.9% IV SOLN 30 UNIT/500ML 30-0.9/5 UT/ML-%
10 SOLUTION INTRAVENOUS ONCE AS NEEDED
Status: DISCONTINUED | OUTPATIENT
Start: 2025-01-20 | End: 2025-01-22 | Stop reason: HOSPADM

## 2025-01-20 RX ORDER — MISOPROSTOL 200 UG/1
200 TABLET ORAL ONCE
Status: COMPLETED | OUTPATIENT
Start: 2025-01-20 | End: 2025-01-20

## 2025-01-20 RX ORDER — CARBOPROST TROMETHAMINE 250 UG/ML
250 INJECTION, SOLUTION INTRAMUSCULAR
Status: DISCONTINUED | OUTPATIENT
Start: 2025-01-20 | End: 2025-01-22 | Stop reason: HOSPADM

## 2025-01-20 RX ORDER — PRENATAL WITH FERROUS FUM AND FOLIC ACID 3080; 920; 120; 400; 22; 1.84; 3; 20; 10; 1; 12; 200; 27; 25; 2 [IU]/1; [IU]/1; MG/1; [IU]/1; MG/1; MG/1; MG/1; MG/1; MG/1; MG/1; UG/1; MG/1; MG/1; MG/1; MG/1
1 TABLET ORAL DAILY
Status: DISCONTINUED | OUTPATIENT
Start: 2025-01-21 | End: 2025-01-22 | Stop reason: HOSPADM

## 2025-01-20 RX ORDER — ONDANSETRON 8 MG/1
8 TABLET, ORALLY DISINTEGRATING ORAL EVERY 8 HOURS PRN
Status: DISCONTINUED | OUTPATIENT
Start: 2025-01-20 | End: 2025-01-22 | Stop reason: HOSPADM

## 2025-01-20 RX ORDER — HYDROCODONE BITARTRATE AND ACETAMINOPHEN 5; 325 MG/1; MG/1
1 TABLET ORAL EVERY 4 HOURS PRN
Status: DISCONTINUED | OUTPATIENT
Start: 2025-01-20 | End: 2025-01-22 | Stop reason: HOSPADM

## 2025-01-20 RX ORDER — DIPHENOXYLATE HYDROCHLORIDE AND ATROPINE SULFATE 2.5; .025 MG/1; MG/1
2 TABLET ORAL EVERY 6 HOURS PRN
Status: DISCONTINUED | OUTPATIENT
Start: 2025-01-20 | End: 2025-01-22 | Stop reason: HOSPADM

## 2025-01-20 RX ORDER — DIPHENHYDRAMINE HCL 25 MG
25 CAPSULE ORAL EVERY 4 HOURS PRN
Status: DISCONTINUED | OUTPATIENT
Start: 2025-01-20 | End: 2025-01-22 | Stop reason: HOSPADM

## 2025-01-20 RX ORDER — OXYTOCIN 10 [USP'U]/ML
10 INJECTION, SOLUTION INTRAMUSCULAR; INTRAVENOUS ONCE AS NEEDED
Status: DISCONTINUED | OUTPATIENT
Start: 2025-01-20 | End: 2025-01-22 | Stop reason: HOSPADM

## 2025-01-20 RX ORDER — TERBUTALINE SULFATE 1 MG/ML
0.25 INJECTION SUBCUTANEOUS ONCE
Status: DISCONTINUED | OUTPATIENT
Start: 2025-01-20 | End: 2025-01-20

## 2025-01-20 RX ORDER — OXYTOCIN-SODIUM CHLORIDE 0.9% IV SOLN 30 UNIT/500ML 30-0.9/5 UT/ML-%
95 SOLUTION INTRAVENOUS ONCE AS NEEDED
Status: DISCONTINUED | OUTPATIENT
Start: 2025-01-20 | End: 2025-01-22 | Stop reason: HOSPADM

## 2025-01-20 RX ORDER — DOCUSATE SODIUM 100 MG/1
200 CAPSULE, LIQUID FILLED ORAL 2 TIMES DAILY PRN
Status: DISCONTINUED | OUTPATIENT
Start: 2025-01-20 | End: 2025-01-22 | Stop reason: HOSPADM

## 2025-01-20 RX ORDER — ACETAMINOPHEN 325 MG/1
650 TABLET ORAL EVERY 6 HOURS SCHEDULED
Status: DISCONTINUED | OUTPATIENT
Start: 2025-01-20 | End: 2025-01-22 | Stop reason: HOSPADM

## 2025-01-20 RX ORDER — BUPIVACAINE HYDROCHLORIDE 2.5 MG/ML
INJECTION, SOLUTION EPIDURAL; INFILTRATION; INTRACAUDAL
Status: DISCONTINUED | OUTPATIENT
Start: 2025-01-20 | End: 2025-01-20

## 2025-01-20 RX ORDER — METHYLERGONOVINE MALEATE 0.2 MG/ML
200 INJECTION INTRAVENOUS ONCE AS NEEDED
Status: DISCONTINUED | OUTPATIENT
Start: 2025-01-20 | End: 2025-01-22 | Stop reason: HOSPADM

## 2025-01-20 RX ORDER — TERBUTALINE SULFATE 1 MG/ML
INJECTION SUBCUTANEOUS
Status: DISPENSED
Start: 2025-01-20 | End: 2025-01-20

## 2025-01-20 RX ORDER — SIMETHICONE 80 MG
1 TABLET,CHEWABLE ORAL EVERY 6 HOURS PRN
Status: DISCONTINUED | OUTPATIENT
Start: 2025-01-20 | End: 2025-01-22 | Stop reason: HOSPADM

## 2025-01-20 RX ORDER — IBUPROFEN 600 MG/1
600 TABLET ORAL EVERY 6 HOURS PRN
Status: DISCONTINUED | OUTPATIENT
Start: 2025-01-20 | End: 2025-01-22 | Stop reason: HOSPADM

## 2025-01-20 RX ORDER — HYDROCORTISONE 25 MG/G
CREAM TOPICAL 3 TIMES DAILY PRN
Status: DISCONTINUED | OUTPATIENT
Start: 2025-01-20 | End: 2025-01-22 | Stop reason: HOSPADM

## 2025-01-20 RX ADMIN — Medication 2 MILLI-UNITS/MIN: at 08:01

## 2025-01-20 RX ADMIN — Medication 10 ML/HR: at 05:01

## 2025-01-20 RX ADMIN — MISOPROSTOL 800 MCG: 200 TABLET ORAL at 03:01

## 2025-01-20 RX ADMIN — MISOPROSTOL 200 MCG: 200 TABLET ORAL at 03:01

## 2025-01-20 RX ADMIN — SODIUM CHLORIDE, POTASSIUM CHLORIDE, SODIUM LACTATE AND CALCIUM CHLORIDE: 600; 310; 30; 20 INJECTION, SOLUTION INTRAVENOUS at 04:01

## 2025-01-20 RX ADMIN — LIDOCAINE HYDROCHLORIDE,EPINEPHRINE BITARTRATE 10 ML: 10; .01 INJECTION, SOLUTION INFILTRATION; PERINEURAL at 11:01

## 2025-01-20 RX ADMIN — ACETAMINOPHEN 650 MG: 325 TABLET ORAL at 06:01

## 2025-01-20 RX ADMIN — BUPIVACAINE HYDROCHLORIDE 3 ML: 2.5 INJECTION, SOLUTION EPIDURAL; INFILTRATION; INTRACAUDAL; PERINEURAL at 05:01

## 2025-01-20 RX ADMIN — MISOPROSTOL 50 MCG: 100 TABLET ORAL at 02:01

## 2025-01-20 RX ADMIN — IBUPROFEN 600 MG: 600 TABLET ORAL at 06:01

## 2025-01-20 RX ADMIN — BUPIVACAINE HYDROCHLORIDE 3 ML: 2.5 INJECTION, SOLUTION EPIDURAL; INFILTRATION; INTRACAUDAL; PERINEURAL at 04:01

## 2025-01-20 NOTE — NURSING
Pt is  at 40 weeks 2 days arrived to L&D unit via wheelchair with complaints of SROM. Pt grossly ruptured. Care assumed. Full assessment done, history and medications reviewed with pt. Pt updated on plan of care with questions answered. Bed in low locked position, call bell in reach. Pt placed on EFM and TOCO, automatic BP and pulse ox. Order rcvd from Dr Peña to administer cytotec.

## 2025-01-20 NOTE — LACTATION NOTE
01/20/25 1650   Maternal Assessment   Breast Density Bilateral:;soft   Areola Bilateral:;dense   Nipples Bilateral:;short;graspable   Maternal Infant Feeding   Maternal Emotional State assist needed   Infant Positioning clutch/football;cradle   Signs of Milk Transfer audible swallow;infant jaw motion present   Pain with Feeding no   Comfort Measures Before/During Feeding infant position adjusted;latch adjusted   Latch Assistance yes   Breast Pumping   Breast Pumping hand expression utilized     Infant skin to skin with patient. Assisted with latching on the left breast in cradle position. Infant opened mouth and latched but only few sucks before releasing. Used gloved finger to elicit sucking reflex. Infant holds finger in mouth but would not suck. Switched to football hold-infant again would latch but would suck inconsistently and then release. Nipple shield and instructions on use provided. With nipple shield, infant able to obtain and sustain effective latch on left breast in football position. Suck and swallow verified. Patient denies pain with breastfeeding. After approximately 13 minutes, infant moved to cradle position on right breast without nipple shield. Infant able to obtain effective latch, more consistent sucking noted. Signs of an effective latch and hunger/satiety cues reviewed. Encouraged to feed infant on cue at least 8 in 24. Basic breastfeeding information reviewed utilizing breastfeeding guide. Patient verbalized understanding. All questions answered. Told to call for further lactation support needs.

## 2025-01-20 NOTE — ANESTHESIA PREPROCEDURE EVALUATION
01/20/2025  Juan Mcdaniels is a 29 y.o., female.  To undergo labor.      Past Medical History:  History reviewed. No pertinent past medical history.    Past Surgical History:  History reviewed. No pertinent surgical history.    Social History:  Social History     Socioeconomic History    Marital status:    Tobacco Use    Smoking status: Never    Smokeless tobacco: Never   Substance and Sexual Activity    Alcohol use: Not Currently    Drug use: Never    Sexual activity: Yes       Medications:  No current facility-administered medications on file prior to encounter.     Current Outpatient Medications on File Prior to Encounter   Medication Sig Dispense Refill    aspirin (ECOTRIN) 81 MG EC tablet Take 1 tablet (81 mg total) by mouth once daily. 90 tablet 2    docusate sodium (COLACE) 100 MG capsule Take 1 capsule (100 mg total) by mouth 2 (two) times daily. 60 capsule 0    ferrous sulfate 325 (65 FE) MG EC tablet Take 1 tablet (325 mg total) by mouth once daily. 90 tablet 2    ondansetron (ZOFRAN-ODT) 4 MG TbDL Take 1 tablet (4 mg total) by mouth every 6 (six) hours as needed. 30 tablet 1    PNV,calcium 72/iron/folic acid (PRENATAL VITAMIN) Tab Take 1 tablet by mouth once daily. 90 tablet 3       Allergies:  Review of patient's allergies indicates:  No Known Allergies    Active Problems:  Patient Active Problem List   Diagnosis    Obesity affecting pregnancy, antepartum       Diagnostic Studies:    CBC:  Recent Labs   Lab 01/19/25  2128   WBC 6.90   RBC 4.29   HGB 12.4   HCT 36.5*      MCV 85   MCH 28.9   MCHC 34.0      24 Hour Vitals:  Temp:  [36.7 °C (98.1 °F)] 36.7 °C (98.1 °F)  Pulse:  [] 89  Resp:  [18] 18  BP: (120-143)/(76-95) 143/91   See Nursing Charting For Additional Vitals      Pre-op Assessment    I have reviewed the Patient Summary Reports.     I have reviewed the Nursing Notes.        Review of Systems  Anesthesia Hx:  No previous Anesthesia                Social:  Non-Smoker, No Alcohol Use       Cardiovascular:  Cardiovascular Normal Exercise tolerance: good                                             Pulmonary:  Pulmonary Normal                       Hepatic/GI:  Hepatic/GI Normal                    Neurological:  Neurology Normal                                      Endocrine:        Obesity / BMI > 30      Physical Exam  General: Well nourished and Cooperative    Airway:  Mallampati: III   Mouth Opening: Normal  TM Distance: Normal    Dental:  Intact    Chest/Lungs:  Clear to auscultation, Normal Respiratory Rate    Heart:  Rate: Normal  Rhythm: Regular Rhythm        Anesthesia Plan  Type of Anesthesia, risks & benefits discussed:    Anesthesia Type: Gen ETT, Epidural, Spinal, CSE  Intra-op Monitoring Plan: Standard ASA Monitors  Post Op Pain Control Plan: multimodal analgesia and epidural analgesia  Induction:  IV  Informed Consent: Informed consent signed with the Patient and all parties understand the risks and agree with anesthesia plan.  All questions answered.   ASA Score: 2    Ready For Surgery From Anesthesia Perspective.     .

## 2025-01-20 NOTE — DISCHARGE INSTRUCTIONS
After a Vaginal Birth    General Discharge Instructions    May follow a regular diet, unless otherwise discussed with physician.  Take showers, not baths unless otherwise discussed with physician.  Activity as tolerated.  No lifting or heavy exercise for 6 weeks, no driving for 2 weeks, no sexual intercourse, douching or tampons for 6 weeks  May return to work/school as discussed with physician  Take medications as directed  Discuss birth control with physician  Breast care support bra worn at all times  Lactation consultant referral number ( [phone] or [phone])    Call Your Healthcare Provider Right Away If You Have:  A temperature of 100.4°F or higher.  If your blood pressure is over 140/90.  You have difficulty catching your breath or trouble breathing.  Heavy vaginal bleeding, clots, or vaginal discharge with foul odor. (heavier than menses)  Persistent nausea or vomiting.  You gain more than 3 pounds in 3 days.  Severe headaches not relieved by Tylenol (acetaminophen) or Motrin (ibuprofen)  Blurry or double vision, see spots or flashing lights.  Dizziness or fainting.  New onset swelling or worsening of existing swelling.  Burning or pain when you urinate.  No bowel movement for 5 days.  Redness, warmth, swelling, or pain in the lower leg.  Redness, discharge, or pain worse than you had in the hospital.  Burning, pain, red streaks, or lumpy areas in your breasts.  Cracks, blisters, or blood on your nipples.  Feelings of extreme sadness or anxiety, or a feeling that you dont want to be with your baby.  If you have any new or unusual symptoms or have questions or concerns    Some of these symptoms can occur up to 4 to 6 weeks after delivery. This can be a sign of pre-eclampsia, which is a serious disease that can cause stroke, seizures, organ damage, or death. Do not wait to call your doctor or seek medical attention.    If You Had Stitches  You may have received stitches in the skin near your vagina.  The stitches might have closed an episiotomy (an incision that enlarges the opening of the vagina). Or you may have needed stitches to repair torn skin. Either way, your stitches should dissolve within weeks. Until then, you can help reduce discomfort, aid healing, and reduce your risk of infection by keeping the stitches clean. These tips can help:    Gently wipe from front to back after you urinate or have a bowel movement.  After wiping, spray warm water on the area. Or you can have a sitz bath. This means sitting in a tub with a few inches of water in it. Then pat the area dry or use a hairdryer on a cool setting.  You can take a shower instead of a bath.  Change sanitary pads at least every 2-4 hours.  Place cold or heat packs on the area as directed by your doctors or nurses. Keep a thin towel between the pack and your skin.  Sit on firm seats so the stitches pull less.     Follow-Up  Schedule a  follow-up exam with your healthcare provider for about 6 weeks after delivery. During this exam, your uterus and vaginal area will be checked. Contact your healthcare provider if you think you or your baby are having any problems.                        Breastfeeding Discharge Instructions     AAP recommendation of exclusive breastfeeding for the first 6 months of life and continued breastfeeding with the introduction of supplemental foods beyond the first year of life and recommends to delay all bottle and pacifier use until after 4 weeks of age and breastfeeding is well established.  Discussed the benefits of exclusive breastfeeding for both mother and baby.  Discussed the risks of supplementation/pacifier use on the exclusivity of breastfeeding in the first 6 months. Feed the baby at the earliest sign of hunger or comfort  Hands to mouth, sucking motions  Rooting or searching for something to suck on  Dont wait for crying - it is a not a late sign of hunger; it is a sign of distress    The  feedings may be 8-12 times per 24hrs and will not follow a schedule  Alternate the breast you start the feeding with, or start with the breast that feels the fullest  Switch breasts when the baby takes himself off the breast or falls asleep  Keep offering breasts until the baby looks full, no longer gives hunger signs, and stays asleep when placed on his back in the crib  If the baby is sleepy and wont wake for a feeding, put the baby skin-to-skin dressed in a diaper against the mothers bare chest  Sleep near your baby  The baby should be positioned and latched on to the breast correctly  Chest-to-chest, chin in the breast  Babys lips are flipped outward  Babys mouth is stretched open wide like a shout  Babys sucking should feel like tugging to the mother  The baby should be drinking at the breast:  You should hear swallowing or gulping throughout the feeding  You should see milk on the babys lips when he comes off the breast  Your breasts should be softer when the baby is finished feeding  The baby should look relaxed at the end of feedings  After the 4th day and your milk is in:  The babys poop should turn bright yellow and be loose, watery, and seedy  The baby should have at least 3-4 poops the size of the palm of your hand per day  The baby should have at least 6-8 wet diapers per day  The urine should be light yellow in color  You should drink when you are thirsty and eat a healthy diet when you are    hungry.     Take naps to get the rest you need.   Take medications and/or drink alcohol only with permission of your obstetrician    or the babys pediatrician.  You can also call the [facility] Risk Center,   ([phone]), Monday-Friday, 8am-5pm Central time, to get the most   up-to-date evidence-based information on the use of medications during   pregnancy and breastfeeding.      The baby should be examined by a pediatrician at 3-5 days of age; unless ordered sooner by the pediatrician.  Once your  milk comes in, the baby should be back to birth weight no later than 10-14 days of age.    Primary Engorgement    If the milk is flowing, use wet or dry heat applied to the breasts for approximately 10min prior to each feeding as a comfort measure to facilitate the milk ejection reflex    Follow heat treatment with breast massage to soften hard/lumpy areas of the breast    Use unrestricted, frequent, effective feedings.      Wake baby to feed if necessary    Avoid pacifier and bottle feedings    Hand express or pump breasts to the point of comfort prn    Use cold treatments in the form of ice packs/gel packs/ frozen vegetables wrapped in a soft thin cloth and applied to the breasts for approximately 20min after each feeding until engorgement is resolved    Wear comfortable, supportive bra    Take pain medicine prn    Use anti-inflammatory medications if prescribed by physician    Other:     Pumping Instructions :    Preparation and Hygiene:    Shower daily.  Wear a clean bra each day and wash daily in warm soapy water.  Change wet or moist breast pads frequently.  Moist pads can promote growth of germs.  Actively wash your hands, paying close attention to the area around and under your fingernails, thoroughly with soap and water for 15 seconds before pumping or handling your milk.  Re-wash your hands if you touch anything (scratching your nose, answering the phone, etc) while pumping or handling your milk.   Before pumping your breasts, assemble the pump collection kit and have ready the sterile container and labels.  Place these items on a clean surface next to the breastpump.  Each time after you have finished pumping, take apart all of the parts of the breastpump collection kit and place them in a separate cleaning container (do not place them in the sink).  Be sure to remove the yellow valve from the breastshield and separate the white membrane from the yellow valve.  Rinse all of these parts with cool  water.  Then use a new sponge and/or bottle brush and dishwashing detergent to clean the parts.  Rinse off the soapy water with cool water and air dry on a clean towel covered with a clean cloth.  All parts may also be washed after each use in the top rack of a .  Once each day, sterilize all of the parts of the breastpump collection kit.  This can be done by boiling the kit parts for 10 minutes or by using a Quick Clean Micro-Steam Bag made by [employer]  If condensation appears in the tubing, continue to run the pump with the tubing attached for 1-2 minutes or until the tubing is dry.   Notify your babys nurse or doctor if you become ill or need to take any medication, even over-the-counter medicines.        Collection and Storage of Expressed Breastmilk:         1. Pump your breasts at least 8-10 times every 24 hours.  Double pump (both breasts at  the same time) for at least 15-20 minutes using the most suction that is comfortable.    2. Write the date and time of pumping and the name of any medications you are takingon the babys pre-printed hospital identification label.   3.    Do not touch the inside of the storage containers or lids.      4.        Tightly screw the lid onto the container and place immediately into the                                refrigerator for daily use.  Bottle may remain at room temperature if the next                    feeding is within 4 hours.  5.    Expressed breastmilk should be refrigerated or frozen within 4 hours of                pumping.  6.        Do not store expressed breastmilk on the door of your refrigerator or freeze             where the temperature is warmer.   7.        Refrigerated milk may be stored in for up to 7 days.  At this point it can be              moved to the freezer for 6 -12 months.  8.        Thaw frozen breast milk in overnight in the refrigerator.  Once milk is thawed it              must be used within 24 hours.  9.         Refrigerated breast milk needs to be warmed to room temperature.  Warm by leaving unrefrigerated until it reached room temperature, or place sealed bottle into a cup of warm (not boiling) water or use a bottle warmer.               Never warm breast milk in a microwave or boiling water.    For any questions or concerns call:  Lactation Department at [phone]        [facility] for [facility]feeding [facility]  [facility] Breastfeeding Centers/Lactation Consultants  [facility]...........................................................................................................................................................[phone]  [facility]..............................................................................................................  [phone]  [facility]..........................................................................................................................................................[phone]  [facility]...........................................................................................................................................[phone]  [facility]........................................................................................................................................................[phone]    [facility] [place] ....................................................................................................ibconline.ca  Dr. Ranjit Clemente's online resource provides videos, articles, and information sheets.  Coffective .........................................................................................................................................................................................................................coffective.com  Download the free mobile star to help get off to a great start with  breastfeeding.  Droplet................................................................................................................................................................................................"[employer].Negorama  Breastfeeding needs the most attention in the first five days after birth. Droplets encourages parents to take advantage of this critical window with effective breastfeeding techniques in order to prevent common challenges.  [employer]....................................................................................................................................Screamin Daily Deals.org  Videos that teach and empower mothers and caregivers.  [facility] Risk Center...............................................................................................................................8-262-034-8180  iPAYst  Provides up-to-date information for medication use by moms during pregnancy and while breastfeeding.  Svitlanam...............................................................................................................................................................................................CrystalGenomics.com  Provides online information on breastfeeding and parenting.  La Leche League.......................................................................................................................................................lllalmsla.org  llli.org  Mother-to-mother support groups with education, information support, and encouragement to women who want to breastfeed.  Work and Pump.......................................................................................................................................................... SchoolOut.com  Information about breastfeeding for working moms.  [facility] Breastfeeding [facility]............................... [phone]-BABY ([phone])  [facility]  Find local  breastfeeding support, including lactation consultants, WIC clinics, support groups and more.  [place] Breastfeeding Support.........................................................................................LaBreastfeedingSupport.org  Zip code search of breastfeeding resources in your area.  [facility] for Healthy Babies............................................................................1-800-251-BABY (1135)  2733207pccr.Coub  Connects [place] moms and their families to health and pregnancy resources. Available 24/7.  WIC...................................................................................................................................................................3-133-229-6977  ldh.la.gov/WIC  A statewide nutrition program for pregnant, breastfeeding, and postpartum women, infants, and children under 5years old. Provides foods and nutrition information. Also provides breastfeeding support by peer counselors.      [facility] Services.............................................................................................................................................................dcsno.org  [facility] are located in [facility]. Offers pediatric services, women's health services, WIC services and more.  Baby Café ...............................................................................................................................................................................babycafeusa.org  Free, drop-in, informal breastfeeding support groups offering professional lactation care and intervention.  [facility]................................................ birthmarkdoulas.com  Infant feeding drop-in clinics, lactation services, support groups and education programs.  Cafe au  Lait....................................................................[phone]  Leapfunder.com/groups/[facility]  Free breastfeeding support group for families of color.  Healthy Start [place].......................................................[phone] ([place])  [phone] ([place])  Serves women of childbearing age and addresses issues for aaron.gov/health-department/healthy-start pregnant women and their children from birth to the age of two.  La Leche League [facility].............................................Flixwagon.iChange.com/[employer]  In-person and virtual mother-to-mother support groups with education, information support, and encouragement to women who want to breastfeed.  [facility] [place] at [facility]..............................................................[phone] ([phone])  ..................................................................................................ochsner.org/services/[facility]  Despite their best efforts, sometimes mothers are unable to provide their own milk. If there is ever a time you cannot produce enough milk, donor milk is the next best thing. Also accepting donors.  [employer] Nesting.............................................................................................................................[phone]  nolanesting.com  In-person and virtual support for families through pregnancy, birth and early parenthood.  up to 18 months of age who live in [facility], [facility], [facility] and [facility].

## 2025-01-20 NOTE — PROGRESS NOTES
01/20/25 0654   TeleStgrace Diehl Note - Strip   Strip Reviewed by Shanita Nurse? Yes   TeleStork Shanita Note - Communication   [place] Nurse Communicated with Bedside Nurse Regarding: Fetal Status   TeleStork Shanita Note - Notification   Nurse Notified? Yes   Name of Nurse nurse's stattion

## 2025-01-20 NOTE — ANESTHESIA PROCEDURE NOTES
Epidural    Patient location during procedure: OB   Reason for block: primary anesthetic   Reason for block: labor analgesia requested by patient and obstetrician  Diagnosis: IUP   Start time: 1/20/2025 4:46 AM  Timeout: 1/20/2025 4:45 AM  End time: 1/20/2025 4:56 AM    Staffing  Performing Provider: Bhavesh Elias MD  Authorizing Provider: Bhavesh Elias MD    Staffing  Performed by: Bhavesh Elias MD  Authorized by: Bhavesh Elias MD        Preanesthetic Checklist  Completed: patient identified, IV checked, site marked, risks and benefits discussed, surgical consent, monitors and equipment checked, pre-op evaluation, timeout performed, anesthesia consent given, hand hygiene performed and patient being monitored  Preparation  Patient position: sitting  Prep: ChloraPrep  Patient monitoring: ECG, Pulse Ox, continuous capnometry and Blood Pressure  Reason for block: primary anesthetic   Epidural  Skin Anesthetic: lidocaine 1%  Skin Wheal: 3 mL  Administration type: continuous  Approach: midline  Interspace: L4-5    Injection technique: TERI saline  Needle and Epidural Catheter  Needle type: Tuohy   Needle gauge: 17  Needle length: 3.5 inches  Needle insertion depth: 5.5 cm  Catheter type: end hole and springwound  Catheter size: 19 G  Catheter at skin depth: 10 cm  Insertion Attempts: 1  Test dose: 3 mL of lidocaine 1.5% with Epi 1-to-200,000  Additional Documentation: incremental injection, negative aspiration for heme and CSF, no paresthesia on injection, no signs/symptoms of IV or SA injection, no significant pain on injection and no significant complaints from patient  Needle localization: anatomical landmarks  Assessment  Ease of block: easy  Patient's tolerance of the procedure: comfortable throughout block and no complaints No inadvertent dural puncture with Tuohy.  Dural puncture not performed with spinal needle

## 2025-01-20 NOTE — NURSING
0755-MD Peña call to unit with update. RN communicated decelerations and interventions. SVE 3/80/-2. MD gave order to start low dose pitocin when FHT reactive.     0800-RN updated pt on plan of care and educated on medication. Pt agreed with plan of care and all questions answered.     0953-RN updated MD Peña on SVE.     1110-RN updated MD Peña on SVE.    1207-RN call to MD Peña to notify of SVE, FHT, and interventions.     1408-RN updated MD Peña SVE unchanged with no movement during practice push. FHT not reactive, but reassuring. Pitocin back on and peanut ball in place.

## 2025-01-21 PROBLEM — O99.210 OBESITY AFFECTING PREGNANCY, ANTEPARTUM: Status: RESOLVED | Noted: 2025-01-16 | Resolved: 2025-01-21

## 2025-01-21 LAB
BASOPHILS # BLD AUTO: 0.03 K/UL (ref 0–0.2)
BASOPHILS NFR BLD: 0.3 % (ref 0–1.9)
DIFFERENTIAL METHOD BLD: ABNORMAL
EOSINOPHIL # BLD AUTO: 0.1 K/UL (ref 0–0.5)
EOSINOPHIL NFR BLD: 1 % (ref 0–8)
ERYTHROCYTE [DISTWIDTH] IN BLOOD BY AUTOMATED COUNT: 13.4 % (ref 11.5–14.5)
HCT VFR BLD AUTO: 28.6 % (ref 37–48.5)
HGB BLD-MCNC: 9.3 G/DL (ref 12–16)
IMM GRANULOCYTES # BLD AUTO: 0.1 K/UL (ref 0–0.04)
IMM GRANULOCYTES NFR BLD AUTO: 1 % (ref 0–0.5)
LYMPHOCYTES # BLD AUTO: 2.5 K/UL (ref 1–4.8)
LYMPHOCYTES NFR BLD: 24.5 % (ref 18–48)
MCH RBC QN AUTO: 28.5 PG (ref 27–31)
MCHC RBC AUTO-ENTMCNC: 32.5 G/DL (ref 32–36)
MCV RBC AUTO: 88 FL (ref 82–98)
MONOCYTES # BLD AUTO: 0.9 K/UL (ref 0.3–1)
MONOCYTES NFR BLD: 8.5 % (ref 4–15)
NEUTROPHILS # BLD AUTO: 6.6 K/UL (ref 1.8–7.7)
NEUTROPHILS NFR BLD: 64.7 % (ref 38–73)
NRBC BLD-RTO: 0 /100 WBC
PLATELET # BLD AUTO: 143 K/UL (ref 150–450)
PMV BLD AUTO: 10.2 FL (ref 9.2–12.9)
RBC # BLD AUTO: 3.26 M/UL (ref 4–5.4)
WBC # BLD AUTO: 10.14 K/UL (ref 3.9–12.7)

## 2025-01-21 PROCEDURE — 25000003 PHARM REV CODE 250: Performed by: OBSTETRICS & GYNECOLOGY

## 2025-01-21 PROCEDURE — 85025 COMPLETE CBC W/AUTO DIFF WBC: CPT | Performed by: OBSTETRICS & GYNECOLOGY

## 2025-01-21 PROCEDURE — 11000001 HC ACUTE MED/SURG PRIVATE ROOM

## 2025-01-21 PROCEDURE — 99231 SBSQ HOSP IP/OBS SF/LOW 25: CPT | Mod: ,,, | Performed by: OBSTETRICS & GYNECOLOGY

## 2025-01-21 PROCEDURE — 36415 COLL VENOUS BLD VENIPUNCTURE: CPT | Performed by: OBSTETRICS & GYNECOLOGY

## 2025-01-21 RX ADMIN — ACETAMINOPHEN 650 MG: 325 TABLET ORAL at 05:01

## 2025-01-21 RX ADMIN — ACETAMINOPHEN 650 MG: 325 TABLET ORAL at 12:01

## 2025-01-21 RX ADMIN — ACETAMINOPHEN 650 MG: 325 TABLET ORAL at 06:01

## 2025-01-21 RX ADMIN — HYDROCODONE BITARTRATE AND ACETAMINOPHEN 1 TABLET: 5; 325 TABLET ORAL at 10:01

## 2025-01-21 RX ADMIN — PRENATAL VITAMINS-IRON FUMARATE 27 MG IRON-FOLIC ACID 0.8 MG TABLET 1 TABLET: at 08:01

## 2025-01-21 RX ADMIN — ACETAMINOPHEN 650 MG: 325 TABLET ORAL at 11:01

## 2025-01-21 RX ADMIN — HYDROCODONE BITARTRATE AND ACETAMINOPHEN 1 TABLET: 5; 325 TABLET ORAL at 03:01

## 2025-01-21 NOTE — H&P
[facility]  Obstetrics  History & Physical    Patient Name: Juan Mcdaniels  MRN: [id number]  Admission Date: 2025  Primary Care Provider: No, Primary Doctor    Subjective:     Principal Problem:Obesity affecting pregnancy, antepartum    History of Present Illness:  Pt is a 28 yo  @ 40w2d presented to L&D on 25 with c/o ROM @ 20:30 clear fluid  Pt denies ctx on admission  ROM plus positive, pt was admitted    Obstetric HPI:  Patient reports comfortable contractions, active fetal movement, No vaginal bleeding , No loss of fluid     This pregnancy has been complicated by anemia, obesity    OB History    Para Term  AB Living   1 1 1 0 0 1   SAB IAB Ectopic Multiple Live Births   0 0 0 0 1      # Outcome Date GA Lbr Víctor/2nd Weight Sex Type Anes PTL Lv   1 Term 25 40w2d / 03:22 3.34 kg (7 lb 5.8 oz) M Vag-Spont EPI N MAI      Name: Alec Mcdaniels      Apgar1: 9  Apgar5: 9     History reviewed. No pertinent past medical history.  History reviewed. No pertinent surgical history.    PTA Medications   Medication Sig    aspirin (ECOTRIN) 81 MG EC tablet Take 1 tablet (81 mg total) by mouth once daily.    docusate sodium (COLACE) 100 MG capsule Take 1 capsule (100 mg total) by mouth 2 (two) times daily.    ferrous sulfate 325 (65 FE) MG EC tablet Take 1 tablet (325 mg total) by mouth once daily.    ondansetron (ZOFRAN-ODT) 4 MG TbDL Take 1 tablet (4 mg total) by mouth every 6 (six) hours as needed.    PNV,calcium 72/iron/folic acid (PRENATAL VITAMIN) Tab Take 1 tablet by mouth once daily.       Review of patient's allergies indicates:  No Known Allergies     Family History    None       Tobacco Use    Smoking status: Never    Smokeless tobacco: Never   Substance and Sexual Activity    Alcohol use: Not Currently    Drug use: Never    Sexual activity: Yes     Review of Systems   Constitutional:  Negative for appetite change, chills and fever.   Respiratory:  Negative for shortness  of breath.    Cardiovascular:  Negative for chest pain.   Gastrointestinal:  Negative for abdominal pain, blood in stool, constipation, diarrhea, nausea and vomiting.   Endocrine: Negative for hair loss and hot flashes.   Genitourinary:  Negative for decreased libido, dysmenorrhea, dyspareunia, dysuria, genital sores, menorrhagia, menstrual problem, pelvic pain, vaginal discharge, vaginal pain, urinary incontinence and vaginal odor.   Musculoskeletal:  Negative for back pain.   Integumentary:  Negative for rash, acne, hair changes, breast mass, nipple discharge and breast skin changes.   Breast: Negative for mass, mastodynia, nipple discharge and skin changes     Objective:     Vital Signs (Most Recent):  Temp: 98 °F (36.7 °C) (01/20/25 0756)  Pulse: (!) 0 (01/20/25 1745)  Resp: 18 (01/20/25 0741)  BP: 124/66 (01/20/25 1628)  SpO2: 96 % (01/20/25 1550) Vital Signs (24h Range):  Temp:  [98 °F (36.7 °C)-98.1 °F (36.7 °C)] 98 °F (36.7 °C)  Pulse:  [0-151] 0  Resp:  [18] 18  SpO2:  [92 %-100 %] 96 %  BP: (101-159)/(57-99) 124/66        There is no height or weight on file to calculate BMI.    FHT: 150 bpm, mod gregory, +accels, few prolonged decelerations with return to baseline. Cat 2 (reassuring)  TOCO:  Q 2-3 minutes     Physical Exam:   Constitutional: She is oriented to person, place, and time. She appears well-developed and well-nourished.    HENT:   Head: Normocephalic and atraumatic.    Eyes: Pupils are equal, round, and reactive to light. EOM are normal.     Cardiovascular:       Exam reveals no clubbing and no cyanosis.        Pulmonary/Chest: Effort normal.        Abdominal: Soft. She exhibits no mass. There is no rebound and no guarding. No hernia.             Musculoskeletal: Normal range of motion and moves all extremeties.       Neurological: She is alert and oriented to person, place, and time.    Skin: Skin is warm. No cyanosis. Nails show no clubbing.    Psychiatric: She has a normal mood and affect. Her  behavior is normal. Thought content normal.        Cervix:  Dilation:  10  Effacement:  100%  Station: 0  Presentation: vertex     Significant Labs:  Lab Results   Component Value Date    GROUPTRH O POS 2025    HEPBSAG Non-reactive 01/15/2025       I have personallly reviewed all pertinent lab results from the last 24 hours.  Recent Lab Results         25  2128        Albumin 3.1              ALT 19       Anion Gap 12       AST 17       Baso # 0.02       Basophil % 0.3       BILIRUBIN TOTAL 0.2  Comment: For infants and newborns, interpretation of results should be based  on gestational age, weight and in agreement with clinical  observations.    Premature Infant recommended reference ranges:  Up to 24 hours.............<8.0 mg/dL  Up to 48 hours............<12.0 mg/dL  3-5 days..................<15.0 mg/dL  6-29 days.................<15.0 mg/dL         BUN 8       Calcium 9.6       Chloride 106       CO2 20       Creatinine 0.6       Differential Method Automated       eGFR >60       Eos # 0.0       Eos % 0.6       Glucose 110       Gran # (ANC) 4.1       Gran % 59.4       Group & Rh O POS       Hematocrit 36.5       Hemoglobin 12.4       Immature Grans (Abs) 0.07  Comment: Mild elevation in immature granulocytes is non specific and   can be seen in a variety of conditions including stress response,   acute inflammation, trauma and pregnancy. Correlation with other   laboratory and clinical findings is essential.         Immature Granulocytes 1.0       INDIRECT DUSTIN NEG       Lymph # 2.2       Lymph % 31.6       MCH 28.9       MCHC 34.0       MCV 85       Mono # 0.5       Mono % 7.1       MPV 10.5       nRBC 0       Platelet Count 189       Potassium 3.9       PROTEIN TOTAL 7.1       RBC 4.29       RDW 13.4       Sodium 138       WBC 6.90             Assessment/Plan:     29 y.o. female  at 40w2d for:    * Obesity affecting pregnancy, antepartum  PROM at term, now in active  labor  Continue pushing, anticipate vaginal delivery        [facility]-Octaviano Peña MD  Obstetrics  [facility] [place] [facility]

## 2025-01-21 NOTE — SUBJECTIVE & OBJECTIVE
Obstetric HPI:  Patient reports now comfortable with epidural, active fetal movement, No vaginal bleeding , Yes loss of fluid     This pregnancy has been complicated by anemia, obesity     OB History    Para Term  AB Living   1 1 1 0 0 1   SAB IAB Ectopic Multiple Live Births   0 0 0 0 1      # Outcome Date GA Lbr Víctor/2nd Weight Sex Type Anes PTL Lv   1 Term 25 40w2d / 03:22 3.34 kg (7 lb 5.8 oz) M Vag-Spont EPI N MAI      Name: Alec Mcdaniels      Apgar1: 9  Apgar5: 9     History reviewed. No pertinent past medical history.  History reviewed. No pertinent surgical history.    PTA Medications   Medication Sig    aspirin (ECOTRIN) 81 MG EC tablet Take 1 tablet (81 mg total) by mouth once daily.    docusate sodium (COLACE) 100 MG capsule Take 1 capsule (100 mg total) by mouth 2 (two) times daily.    ferrous sulfate 325 (65 FE) MG EC tablet Take 1 tablet (325 mg total) by mouth once daily.    ondansetron (ZOFRAN-ODT) 4 MG TbDL Take 1 tablet (4 mg total) by mouth every 6 (six) hours as needed.    PNV,calcium 72/iron/folic acid (PRENATAL VITAMIN) Tab Take 1 tablet by mouth once daily.       Review of patient's allergies indicates:  No Known Allergies     Family History    None       Tobacco Use    Smoking status: Never    Smokeless tobacco: Never   Substance and Sexual Activity    Alcohol use: Not Currently    Drug use: Never    Sexual activity: Yes     Review of Systems   Constitutional:  Negative for appetite change, chills and fever.   Respiratory:  Negative for shortness of breath.    Cardiovascular:  Negative for chest pain.   Gastrointestinal:  Negative for abdominal pain, blood in stool, constipation, diarrhea, nausea and vomiting.   Endocrine: Negative for hair loss and hot flashes.   Genitourinary:  Negative for decreased libido, dysmenorrhea, dyspareunia, dysuria, genital sores, menorrhagia, menstrual problem, pelvic pain, vaginal discharge, vaginal pain, urinary incontinence and vaginal  odor.   Musculoskeletal:  Negative for back pain.   Integumentary:  Negative for rash, acne, hair changes, breast mass, nipple discharge and breast skin changes.   Breast: Negative for mass, mastodynia, nipple discharge and skin changes     Objective:     Vital Signs (Most Recent):  Temp: 98 °F (36.7 °C) (01/20/25 0756)  Pulse: (!) 0 (01/20/25 1745)  Resp: 18 (01/20/25 0741)  BP: 124/66 (01/20/25 1628)  SpO2: 96 % (01/20/25 1550) Vital Signs (24h Range):  Temp:  [98 °F (36.7 °C)-98.1 °F (36.7 °C)] 98 °F (36.7 °C)  Pulse:  [0-151] 0  Resp:  [18] 18  SpO2:  [92 %-100 %] 96 %  BP: (101-159)/(57-99) 124/66        There is no height or weight on file to calculate BMI.    FHT: 150 bpm, min to mod variability, + accels, few prolonged deceleration. Cat 2 (reassuring)  TOCO:  Q 2-3 min     Physical Exam:   Constitutional: She is oriented to person, place, and time. She appears well-developed and well-nourished.    HENT:   Head: Normocephalic and atraumatic.    Eyes: Pupils are equal, round, and reactive to light. EOM are normal.     Cardiovascular:       Exam reveals no clubbing and no cyanosis.        Pulmonary/Chest: Effort normal.        Abdominal: Soft. She exhibits no mass. There is no rebound and no guarding. No hernia.             Musculoskeletal: Normal range of motion and moves all extremeties.       Neurological: She is alert and oriented to person, place, and time.    Skin: Skin is warm. No cyanosis. Nails show no clubbing.    Psychiatric: She has a normal mood and affect. Her behavior is normal. Thought content normal.        Cervix:  Dilation:  10  Effacement:  100%  Station: 0  Presentation: Vertex     Significant Labs:  Lab Results   Component Value Date    GROUPTRH O POS 01/19/2025    HEPBSAG Non-reactive 01/15/2025       I have personallly reviewed all pertinent lab results from the last 24 hours.  Recent Lab Results         01/19/25  2128        Albumin 3.1              ALT 19       Anion Gap 12        AST 17       Baso # 0.02       Basophil % 0.3       BILIRUBIN TOTAL 0.2  Comment: For infants and newborns, interpretation of results should be based  on gestational age, weight and in agreement with clinical  observations.    Premature Infant recommended reference ranges:  Up to 24 hours.............<8.0 mg/dL  Up to 48 hours............<12.0 mg/dL  3-5 days..................<15.0 mg/dL  6-29 days.................<15.0 mg/dL         BUN 8       Calcium 9.6       Chloride 106       CO2 20       Creatinine 0.6       Differential Method Automated       eGFR >60       Eos # 0.0       Eos % 0.6       Glucose 110       Gran # (ANC) 4.1       Gran % 59.4       Group & Rh O POS       Hematocrit 36.5       Hemoglobin 12.4       Immature Grans (Abs) 0.07  Comment: Mild elevation in immature granulocytes is non specific and   can be seen in a variety of conditions including stress response,   acute inflammation, trauma and pregnancy. Correlation with other   laboratory and clinical findings is essential.         Immature Granulocytes 1.0       INDIRECT DUSTIN NEG       Lymph # 2.2       Lymph % 31.6       MCH 28.9       MCHC 34.0       MCV 85       Mono # 0.5       Mono % 7.1       MPV 10.5       nRBC 0       Platelet Count 189       Potassium 3.9       PROTEIN TOTAL 7.1       RBC 4.29       RDW 13.4       Sodium 138       WBC 6.90

## 2025-01-21 NOTE — HOSPITAL COURSE
She was 1.5 cm on admission, cytotec was given, cervix changed to 3 cm, pitocin was started  25 s/p  over 2nd degree midline laceration, c/b PPH from uterine atony s/p cytotec x 1 dose  25. PPD # 1. Doing well

## 2025-01-21 NOTE — PROGRESS NOTES
[facility]  Obstetrics  Postpartum Progress Note    Patient Name: Juan Mcdaniels  MRN: [id number]  Admission Date: 2025  Hospital Length of Stay: 2 days  Attending Physician: Jannet Peña MD  Primary Care Provider: Noemi, Primary Doctor    Subjective:     Principal Problem:Obesity affecting pregnancy, antepartum    Hospital Course:  She was 1.5 cm on admission, cytotec was given, cervix changed to 3 cm, pitocin was started  25 s/p  over 2nd degree midline laceration, c/b PPH from uterine atony s/p cytotec x 1 dose  25. PPD # 1. Doing well    Interval History:     She is doing well this morning. She is tolerating a regular diet without nausea or vomiting. She is voiding spontaneously. She is ambulating. She has passed flatus, and has not a BM. Vaginal bleeding is mild. She denies fever or chills. Abdominal pain is mild and controlled with oral medications. She Is breastfeeding. She desires circumcision for her male baby: yes.    Objective:     Vital Signs (Most Recent):  Temp: 97.7 °F (36.5 °C) (25)  Pulse: 106 (25)  Resp: 19 (25)  BP: 124/69 (25)  SpO2: 98 % (25) Vital Signs (24h Range):  Temp:  [97.7 °F (36.5 °C)-99.6 °F (37.6 °C)] 97.7 °F (36.5 °C)  Pulse:  [0-151] 106  Resp:  [19-20] 19  SpO2:  [92 %-100 %] 98 %  BP: (101-157)/(57-90) 124/69        There is no height or weight on file to calculate BMI.      Intake/Output Summary (Last 24 hours) at 2025 0924  Last data filed at 2025 2300  Gross per 24 hour   Intake 2382.49 ml   Output 1228 ml   Net 1154.49 ml         Significant Labs:  Lab Results   Component Value Date    GROUPTRH O POS 2025    HEPBSAG Non-reactive 01/15/2025     Recent Labs   Lab 25  0722   HGB 9.3*   HCT 28.6*       I have personallly reviewed all pertinent lab results from the last 24 hours.  Recent Lab Results         25  07        Baso # 0.03       Basophil % 0.3        Differential Method Automated       Eos # 0.1       Eos % 1.0       Gran # (ANC) 6.6       Gran % 64.7       Hematocrit 28.6       Hemoglobin 9.3       Immature Grans (Abs) 0.10  Comment: Mild elevation in immature granulocytes is non specific and   can be seen in a variety of conditions including stress response,   acute inflammation, trauma and pregnancy. Correlation with other   laboratory and clinical findings is essential.         Immature Granulocytes 1.0       Lymph # 2.5       Lymph % 24.5       MCH 28.5       MCHC 32.5       MCV 88       Mono # 0.9       Mono % 8.5       MPV 10.2       nRBC 0       Platelet Count 143       RBC 3.26       RDW 13.4       WBC 10.14               Physical Exam:   Constitutional: She is oriented to person, place, and time. She appears well-developed and well-nourished.    HENT:   Head: Normocephalic and atraumatic.    Eyes: Pupils are equal, round, and reactive to light. EOM are normal.     Cardiovascular:       Exam reveals no clubbing and no cyanosis.        Pulmonary/Chest: Effort normal.        Abdominal: Soft. She exhibits no mass. There is no rebound and no guarding. No hernia.             Musculoskeletal: Normal range of motion and moves all extremeties.       Neurological: She is alert and oriented to person, place, and time.    Skin: Skin is warm. No cyanosis. Nails show no clubbing.    Psychiatric: She has a normal mood and affect. Her behavior is normal. Thought content normal.       Review of Systems   Constitutional:  Negative for appetite change, chills and fever.   Respiratory:  Negative for shortness of breath.    Cardiovascular:  Negative for chest pain.   Gastrointestinal:  Negative for abdominal pain, blood in stool, constipation, diarrhea, nausea and vomiting.   Endocrine: Negative for hair loss and hot flashes.   Genitourinary:  Negative for decreased libido, dysmenorrhea, dyspareunia, dysuria, genital sores, menorrhagia, menstrual problem, pelvic pain,  vaginal discharge, vaginal pain, urinary incontinence and vaginal odor.   Musculoskeletal:  Negative for back pain.   Integumentary:  Negative for rash, acne, hair changes, breast mass, nipple discharge and breast skin changes.   Breast: Negative for mass, mastodynia, nipple discharge and skin changes    Assessment/Plan:     29 y.o. female  for:     (spontaneous vaginal delivery)  Routine pp care  Hgb 12.4 -->9.3        Disposition: As patient meets milestones, will plan to discharge .    Jannet Peña MD  Obstetrics  [facility] [place] [facility]

## 2025-01-21 NOTE — L&D DELIVERY NOTE
[facility]  Vaginal Delivery   Operative Note    SUMMARY     Normal spontaneous vaginal delivery of live infant, was placed on mothers abdomen for skin to skin and bulb suctioning performed.  Infant delivered position OA  2nd degree midline lacerations .  Nuchal cord: No.    Spontaneous delivery of placenta and IV pitocin given noting uterine atony with bleeding.  2nd degree laceration noted and repaired in normal fashion with 3-0 chromic .  Patient tolerated delivery well. Sponge needle and lap counted correctly x2.    Indications: Obesity affecting pregnancy, antepartum  Pregnancy complicated by:   Patient Active Problem List   Diagnosis    Obesity affecting pregnancy, antepartum     Admitting GA: 40w2d    Delivery Information for Alec Mcdaniels    Birth information:  YOB: [birth year]25   Time of birth: 3:23 PM   Sex: male   Head Delivery Date/Time: 2025  3:23 PM   Delivery type: Vaginal, Spontaneous   Gestational Age: 40w2d       Delivery Providers    Delivering clinician: Jannet Peña MD   Provider Role    Raquel Arriaga RN Delivery Nurse    Yelena Rodrigez RN Registered Nurse    Argenis Polanco Surgical Tech              Measurements    Weight: 3340 g  Weight (lbs): 7 lb 5.8 oz  Length:          Apgars    Living status: Living  Apgar Component Scores:  1 min.:  5 min.:  10 min.:  15 min.:  20 min.:    Skin color:  1  1       Heart rate:  2  2       Reflex irritability:  2  2       Muscle tone:  2  2       Respiratory effort:  2  2       Total:  9  9       Apgars assigned by: VERITO RODRIGEZ RN         Operative Delivery    Forceps attempted?: No  Vacuum extractor attempted?: No         Shoulder Dystocia    Shoulder dystocia present?: No           Presentation    Presentation: Vertex  Position: Left Occiput Anterior           Interventions/Resuscitation    Method: Bulb Suctioning, Tactile Stimulation       Cord    Vessels: 3 vessels  Complications: None  Delayed Cord Clamping?:  Yes  Cord Clamped Date/Time: 2025  3:24 PM  Cord Blood Disposition: Sent with Baby  Gases Sent?: No  Stem Cell Collection (by MD): No       Placenta    Placenta delivery date/time: 2025 1529  Placenta removal: Spontaneous  Placenta appearance: Intact  Placenta disposition: Discarded           Labor Events:       labor: No     Labor Onset Date/Time:         Dilation Complete Date/Time: 2025 12:01     Start Pushing Date/Time: 2025 15:09       Start Pushing Date/Time: 2025 15:09     Rupture Date/Time: 25        Rupture type: SRM (Spontaneous Rupture)        Fluid Amount:       Fluid Color: Clear              steroids: None     Antibiotics given for GBS: No     Induction: none     Indications for induction:        Augmentation: oxytocin     Indications for augmentation: Ineffective Contraction Pattern     Labor complications: None     Additional complications:          Cervical ripenin2025 10:10 PM      Misoprostol          Delivery:      Episiotomy: None     Indication for Episiotomy:       Perineal Lacerations: 2nd Repaired:  Yes   Periurethral Laceration:   Repaired:     Labial Laceration:   Repaired:     Sulcus Laceration:   Repaired:     Vaginal Laceration:   Repaired:     Cervical Laceration:   Repaired:     Repair suture:       Repair # of packets: 2     Last Value - EBL - Nursing (mL):       Sum - EBL - Nursing (mL): 0     Last Value - EBL - Anesthesia (mL):      Calculated QBL (mL): 728     Running total QBL (mL): 728     Vaginal Sweep Performed: Yes     Surgicount Correct: Yes     Vaginal Packing: No Quantity:       Other providers:       Anesthesia    Method: Epidural          Details (if applicable):  Trial of Labor      Categorization:      Priority:     Indications for :     Incision Type:       Additional  information:  Forceps:    Vacuum:    Breech:    Observed anomalies    Other (Comments):

## 2025-01-21 NOTE — PLAN OF CARE
Problem: Adult Inpatient Plan of Care  Goal: Plan of Care Review  Outcome: Progressing  Goal: Patient-Specific Goal (Individualized)  Outcome: Progressing  Goal: Absence of Hospital-Acquired Illness or Injury  Outcome: Progressing  Goal: Optimal Comfort and Wellbeing  Outcome: Progressing     Problem:  Fall Injury Risk  Goal: Absence of Fall, Infant Drop and Related Injury  Outcome: Progressing     Problem: Infection  Goal: Absence of Infection Signs and Symptoms  Outcome: Progressing     Problem: Skin Injury Risk Increased  Goal: Skin Health and Integrity  Outcome: Progressing     Problem: Breastfeeding  Goal: Effective Breastfeeding  Outcome: Progressing     Problem: Postpartum (Vaginal Delivery)  Goal: Successful Parent Role Transition  Outcome: Progressing  Goal: Hemostasis  Outcome: Met  Goal: Absence of Infection Signs and Symptoms  Outcome: Progressing  Goal: Anesthesia/Sedation Recovery  Outcome: Met  Goal: Optimal Pain Control and Function  Outcome: Progressing  Goal: Effective Urinary Elimination  Outcome: Progressing

## 2025-01-21 NOTE — SUBJECTIVE & OBJECTIVE
Obstetric HPI:  Patient reports comfortable contractions, active fetal movement, No vaginal bleeding , No loss of fluid     This pregnancy has been complicated by anemia, obesity    OB History    Para Term  AB Living   1 1 1 0 0 1   SAB IAB Ectopic Multiple Live Births   0 0 0 0 1      # Outcome Date GA Lbr Víctor/2nd Weight Sex Type Anes PTL Lv   1 Term 25 40w2d / 03:22 3.34 kg (7 lb 5.8 oz) M Vag-Spont EPI N MAI      Name: Alec Mcdaniels      Apgar1: 9  Apgar5: 9     History reviewed. No pertinent past medical history.  History reviewed. No pertinent surgical history.    PTA Medications   Medication Sig    aspirin (ECOTRIN) 81 MG EC tablet Take 1 tablet (81 mg total) by mouth once daily.    docusate sodium (COLACE) 100 MG capsule Take 1 capsule (100 mg total) by mouth 2 (two) times daily.    ferrous sulfate 325 (65 FE) MG EC tablet Take 1 tablet (325 mg total) by mouth once daily.    ondansetron (ZOFRAN-ODT) 4 MG TbDL Take 1 tablet (4 mg total) by mouth every 6 (six) hours as needed.    PNV,calcium 72/iron/folic acid (PRENATAL VITAMIN) Tab Take 1 tablet by mouth once daily.       Review of patient's allergies indicates:  No Known Allergies     Family History    None       Tobacco Use    Smoking status: Never    Smokeless tobacco: Never   Substance and Sexual Activity    Alcohol use: Not Currently    Drug use: Never    Sexual activity: Yes     Review of Systems   Constitutional:  Negative for appetite change, chills and fever.   Respiratory:  Negative for shortness of breath.    Cardiovascular:  Negative for chest pain.   Gastrointestinal:  Negative for abdominal pain, blood in stool, constipation, diarrhea, nausea and vomiting.   Endocrine: Negative for hair loss and hot flashes.   Genitourinary:  Negative for decreased libido, dysmenorrhea, dyspareunia, dysuria, genital sores, menorrhagia, menstrual problem, pelvic pain, vaginal discharge, vaginal pain, urinary incontinence and vaginal odor.    Musculoskeletal:  Negative for back pain.   Integumentary:  Negative for rash, acne, hair changes, breast mass, nipple discharge and breast skin changes.   Breast: Negative for mass, mastodynia, nipple discharge and skin changes     Objective:     Vital Signs (Most Recent):  Temp: 98 °F (36.7 °C) (01/20/25 0756)  Pulse: (!) 0 (01/20/25 1745)  Resp: 18 (01/20/25 0741)  BP: 124/66 (01/20/25 1628)  SpO2: 96 % (01/20/25 1550) Vital Signs (24h Range):  Temp:  [98 °F (36.7 °C)-98.1 °F (36.7 °C)] 98 °F (36.7 °C)  Pulse:  [0-151] 0  Resp:  [18] 18  SpO2:  [92 %-100 %] 96 %  BP: (101-159)/(57-99) 124/66        There is no height or weight on file to calculate BMI.    FHT: 150 bpm, mod gregory, +accels, few prolonged decelerations with return to baseline. Cat 2 (reassuring)  TOCO:  Q 2-3 minutes     Physical Exam:   Constitutional: She is oriented to person, place, and time. She appears well-developed and well-nourished.    HENT:   Head: Normocephalic and atraumatic.    Eyes: Pupils are equal, round, and reactive to light. EOM are normal.     Cardiovascular:       Exam reveals no clubbing and no cyanosis.        Pulmonary/Chest: Effort normal.        Abdominal: Soft. She exhibits no mass. There is no rebound and no guarding. No hernia.             Musculoskeletal: Normal range of motion and moves all extremeties.       Neurological: She is alert and oriented to person, place, and time.    Skin: Skin is warm. No cyanosis. Nails show no clubbing.    Psychiatric: She has a normal mood and affect. Her behavior is normal. Thought content normal.        Cervix:  Dilation:  10  Effacement:  100%  Station: 0  Presentation: vertex     Significant Labs:  Lab Results   Component Value Date    GROUPTRH O POS 01/19/2025    HEPBSAG Non-reactive 01/15/2025       I have personallly reviewed all pertinent lab results from the last 24 hours.  Recent Lab Results         01/19/25 2128        Albumin 3.1              ALT 19       Anion Gap  12       AST 17       Baso # 0.02       Basophil % 0.3       BILIRUBIN TOTAL 0.2  Comment: For infants and newborns, interpretation of results should be based  on gestational age, weight and in agreement with clinical  observations.    Premature Infant recommended reference ranges:  Up to 24 hours.............<8.0 mg/dL  Up to 48 hours............<12.0 mg/dL  3-5 days..................<15.0 mg/dL  6-29 days.................<15.0 mg/dL         BUN 8       Calcium 9.6       Chloride 106       CO2 20       Creatinine 0.6       Differential Method Automated       eGFR >60       Eos # 0.0       Eos % 0.6       Glucose 110       Gran # (ANC) 4.1       Gran % 59.4       Group & Rh O POS       Hematocrit 36.5       Hemoglobin 12.4       Immature Grans (Abs) 0.07  Comment: Mild elevation in immature granulocytes is non specific and   can be seen in a variety of conditions including stress response,   acute inflammation, trauma and pregnancy. Correlation with other   laboratory and clinical findings is essential.         Immature Granulocytes 1.0       INDIRECT DUSTIN NEG       Lymph # 2.2       Lymph % 31.6       MCH 28.9       MCHC 34.0       MCV 85       Mono # 0.5       Mono % 7.1       MPV 10.5       nRBC 0       Platelet Count 189       Potassium 3.9       PROTEIN TOTAL 7.1       RBC 4.29       RDW 13.4       Sodium 138       WBC 6.90

## 2025-01-21 NOTE — PLAN OF CARE
Problem: Adult Inpatient Plan of Care  Goal: Plan of Care Review  Outcome: Progressing  Goal: Patient-Specific Goal (Individualized)  Outcome: Progressing  Goal: Absence of Hospital-Acquired Illness or Injury  Outcome: Progressing  Goal: Optimal Comfort and Wellbeing  Outcome: Progressing  Goal: Readiness for Transition of Care  Outcome: Progressing     Problem: Infection  Goal: Absence of Infection Signs and Symptoms  Outcome: Progressing     Problem: Labor  Goal: Hemostasis  Outcome: Met  Goal: Stable Fetal Wellbeing  Outcome: Met  Goal: Effective Progression to Delivery  Outcome: Met  Goal: Absence of Infection Signs and Symptoms  Outcome: Met  Goal: Acceptable Pain Control  Outcome: Met  Goal: Normal Uterine Contraction Pattern  Outcome: Met     Problem: Breastfeeding  Goal: Effective Breastfeeding  Outcome: Progressing

## 2025-01-21 NOTE — HPI
Pt is a 30 yo  @ 40w2d presented to L&D on 25 with c/o ROM @ 20:30 clear fluid  Pt denies ctx on admission  ROM plus positive, pt was admitted

## 2025-01-21 NOTE — SUBJECTIVE & OBJECTIVE
Interval History:     She is doing well this morning. She is tolerating a regular diet without nausea or vomiting. She is voiding spontaneously. She is ambulating. She has passed flatus, and has not a BM. Vaginal bleeding is mild. She denies fever or chills. Abdominal pain is mild and controlled with oral medications. She Is breastfeeding. She desires circumcision for her male baby: yes.    Objective:     Vital Signs (Most Recent):  Temp: 97.7 °F (36.5 °C) (01/21/25 0701)  Pulse: 106 (01/21/25 0701)  Resp: 19 (01/21/25 0701)  BP: 124/69 (01/21/25 0701)  SpO2: 98 % (01/21/25 0701) Vital Signs (24h Range):  Temp:  [97.7 °F (36.5 °C)-99.6 °F (37.6 °C)] 97.7 °F (36.5 °C)  Pulse:  [0-151] 106  Resp:  [19-20] 19  SpO2:  [92 %-100 %] 98 %  BP: (101-157)/(57-90) 124/69        There is no height or weight on file to calculate BMI.      Intake/Output Summary (Last 24 hours) at 1/21/2025 0924  Last data filed at 1/20/2025 2300  Gross per 24 hour   Intake 2382.49 ml   Output 1228 ml   Net 1154.49 ml         Significant Labs:  Lab Results   Component Value Date    GROUPTRH O POS 01/19/2025    HEPBSAG Non-reactive 01/15/2025     Recent Labs   Lab 01/21/25  0722   HGB 9.3*   HCT 28.6*       I have personallly reviewed all pertinent lab results from the last 24 hours.  Recent Lab Results         01/21/25  0722        Baso # 0.03       Basophil % 0.3       Differential Method Automated       Eos # 0.1       Eos % 1.0       Gran # (ANC) 6.6       Gran % 64.7       Hematocrit 28.6       Hemoglobin 9.3       Immature Grans (Abs) 0.10  Comment: Mild elevation in immature granulocytes is non specific and   can be seen in a variety of conditions including stress response,   acute inflammation, trauma and pregnancy. Correlation with other   laboratory and clinical findings is essential.         Immature Granulocytes 1.0       Lymph # 2.5       Lymph % 24.5       MCH 28.5       MCHC 32.5       MCV 88       Mono # 0.9       Mono % 8.5        MPV 10.2       nRBC 0       Platelet Count 143       RBC 3.26       RDW 13.4       WBC 10.14               Physical Exam:   Constitutional: She is oriented to person, place, and time. She appears well-developed and well-nourished.    HENT:   Head: Normocephalic and atraumatic.    Eyes: Pupils are equal, round, and reactive to light. EOM are normal.     Cardiovascular:       Exam reveals no clubbing and no cyanosis.        Pulmonary/Chest: Effort normal.        Abdominal: Soft. She exhibits no mass. There is no rebound and no guarding. No hernia.             Musculoskeletal: Normal range of motion and moves all extremeties.       Neurological: She is alert and oriented to person, place, and time.    Skin: Skin is warm. No cyanosis. Nails show no clubbing.    Psychiatric: She has a normal mood and affect. Her behavior is normal. Thought content normal.       Review of Systems   Constitutional:  Negative for appetite change, chills and fever.   Respiratory:  Negative for shortness of breath.    Cardiovascular:  Negative for chest pain.   Gastrointestinal:  Negative for abdominal pain, blood in stool, constipation, diarrhea, nausea and vomiting.   Endocrine: Negative for hair loss and hot flashes.   Genitourinary:  Negative for decreased libido, dysmenorrhea, dyspareunia, dysuria, genital sores, menorrhagia, menstrual problem, pelvic pain, vaginal discharge, vaginal pain, urinary incontinence and vaginal odor.   Musculoskeletal:  Negative for back pain.   Integumentary:  Negative for rash, acne, hair changes, breast mass, nipple discharge and breast skin changes.   Breast: Negative for mass, mastodynia, nipple discharge and skin changes

## 2025-01-21 NOTE — ANESTHESIA POSTPROCEDURE EVALUATION
Anesthesia Post Evaluation    Patient: Juan Mcdaniels    Procedure(s) Performed: * No procedures listed *    Final Anesthesia Type: epidural      Patient location during evaluation: labor & delivery  Patient participation: Yes- Able to Participate  Level of consciousness: awake and alert and oriented  Post-procedure vital signs: reviewed and stable  Pain management: adequate  Airway patency: patent    PONV status at discharge: No PONV  Anesthetic complications: no      Cardiovascular status: blood pressure returned to baseline and hemodynamically stable  Respiratory status: unassisted, room air and spontaneous ventilation  Hydration status: euvolemic  Follow-up not needed.              Vitals Value Taken Time   /69 01/21/25 0701   Temp 36.5 °C (97.7 °F) 01/21/25 0701   Pulse 106 01/21/25 0701   Resp 19 01/21/25 0701   SpO2 98 % 01/21/25 0701         No case tracking events are documented in the log.      Pain/Macy Score: Pain Rating Prior to Med Admin: 0 (1/21/2025  5:16 AM)

## 2025-01-21 NOTE — H&P
[facility]  Obstetrics  History & Physical    Patient Name: Juan Mcdaniels  MRN: [id number]  Admission Date: 2025  Primary Care Provider: No, Primary Doctor    Subjective:     Principal Problem:Obesity affecting pregnancy, antepartum    History of Present Illness:  Pt is a 30 yo  @ 40w2d presented to L&D on 25 with c/o ROM @ 20:30 clear fluid  Pt denies ctx on admission  ROM plus positive, pt was admitted    Obstetric HPI:  Patient reports now comfortable with epidural, active fetal movement, No vaginal bleeding , Yes loss of fluid     This pregnancy has been complicated by anemia, obesity     OB History    Para Term  AB Living   1 1 1 0 0 1   SAB IAB Ectopic Multiple Live Births   0 0 0 0 1      # Outcome Date GA Lbr Víctor/2nd Weight Sex Type Anes PTL Lv   1 Term 25 40w2d / 03:22 3.34 kg (7 lb 5.8 oz) M Vag-Spont EPI N MAI      Name: Alec Mcdaniels      Apgar1: 9  Apgar5: 9     History reviewed. No pertinent past medical history.  History reviewed. No pertinent surgical history.    PTA Medications   Medication Sig    aspirin (ECOTRIN) 81 MG EC tablet Take 1 tablet (81 mg total) by mouth once daily.    docusate sodium (COLACE) 100 MG capsule Take 1 capsule (100 mg total) by mouth 2 (two) times daily.    ferrous sulfate 325 (65 FE) MG EC tablet Take 1 tablet (325 mg total) by mouth once daily.    ondansetron (ZOFRAN-ODT) 4 MG TbDL Take 1 tablet (4 mg total) by mouth every 6 (six) hours as needed.    PNV,calcium 72/iron/folic acid (PRENATAL VITAMIN) Tab Take 1 tablet by mouth once daily.       Review of patient's allergies indicates:  No Known Allergies     Family History    None       Tobacco Use    Smoking status: Never    Smokeless tobacco: Never   Substance and Sexual Activity    Alcohol use: Not Currently    Drug use: Never    Sexual activity: Yes     Review of Systems   Constitutional:  Negative for appetite change, chills and fever.   Respiratory:  Negative for  shortness of breath.    Cardiovascular:  Negative for chest pain.   Gastrointestinal:  Negative for abdominal pain, blood in stool, constipation, diarrhea, nausea and vomiting.   Endocrine: Negative for hair loss and hot flashes.   Genitourinary:  Negative for decreased libido, dysmenorrhea, dyspareunia, dysuria, genital sores, menorrhagia, menstrual problem, pelvic pain, vaginal discharge, vaginal pain, urinary incontinence and vaginal odor.   Musculoskeletal:  Negative for back pain.   Integumentary:  Negative for rash, acne, hair changes, breast mass, nipple discharge and breast skin changes.   Breast: Negative for mass, mastodynia, nipple discharge and skin changes     Objective:     Vital Signs (Most Recent):  Temp: 98 °F (36.7 °C) (01/20/25 0756)  Pulse: (!) 0 (01/20/25 1745)  Resp: 18 (01/20/25 0741)  BP: 124/66 (01/20/25 1628)  SpO2: 96 % (01/20/25 1550) Vital Signs (24h Range):  Temp:  [98 °F (36.7 °C)-98.1 °F (36.7 °C)] 98 °F (36.7 °C)  Pulse:  [0-151] 0  Resp:  [18] 18  SpO2:  [92 %-100 %] 96 %  BP: (101-159)/(57-99) 124/66        There is no height or weight on file to calculate BMI.    FHT: 150 bpm, min to mod variability, + accels, few prolonged deceleration. Cat 2 (reassuring)  TOCO:  Q 2-3 min     Physical Exam:   Constitutional: She is oriented to person, place, and time. She appears well-developed and well-nourished.    HENT:   Head: Normocephalic and atraumatic.    Eyes: Pupils are equal, round, and reactive to light. EOM are normal.     Cardiovascular:       Exam reveals no clubbing and no cyanosis.        Pulmonary/Chest: Effort normal.        Abdominal: Soft. She exhibits no mass. There is no rebound and no guarding. No hernia.             Musculoskeletal: Normal range of motion and moves all extremeties.       Neurological: She is alert and oriented to person, place, and time.    Skin: Skin is warm. No cyanosis. Nails show no clubbing.    Psychiatric: She has a normal mood and affect. Her  behavior is normal. Thought content normal.        Cervix:  Dilation:  10  Effacement:  100%  Station: 0  Presentation: Vertex     Significant Labs:  Lab Results   Component Value Date    GROUPTRH O POS 2025    HEPBSAG Non-reactive 01/15/2025       I have personallly reviewed all pertinent lab results from the last 24 hours.  Recent Lab Results         25  2128        Albumin 3.1              ALT 19       Anion Gap 12       AST 17       Baso # 0.02       Basophil % 0.3       BILIRUBIN TOTAL 0.2  Comment: For infants and newborns, interpretation of results should be based  on gestational age, weight and in agreement with clinical  observations.    Premature Infant recommended reference ranges:  Up to 24 hours.............<8.0 mg/dL  Up to 48 hours............<12.0 mg/dL  3-5 days..................<15.0 mg/dL  6-29 days.................<15.0 mg/dL         BUN 8       Calcium 9.6       Chloride 106       CO2 20       Creatinine 0.6       Differential Method Automated       eGFR >60       Eos # 0.0       Eos % 0.6       Glucose 110       Gran # (ANC) 4.1       Gran % 59.4       Group & Rh O POS       Hematocrit 36.5       Hemoglobin 12.4       Immature Grans (Abs) 0.07  Comment: Mild elevation in immature granulocytes is non specific and   can be seen in a variety of conditions including stress response,   acute inflammation, trauma and pregnancy. Correlation with other   laboratory and clinical findings is essential.         Immature Granulocytes 1.0       INDIRECT DUSTIN NEG       Lymph # 2.2       Lymph % 31.6       MCH 28.9       MCHC 34.0       MCV 85       Mono # 0.5       Mono % 7.1       MPV 10.5       nRBC 0       Platelet Count 189       Potassium 3.9       PROTEIN TOTAL 7.1       RBC 4.29       RDW 13.4       Sodium 138       WBC 6.90             Assessment/Plan:     29 y.o. female  at 40w2d for:    * Obesity affecting pregnancy, antepartum  PROM at term, now in active  labor  Continue pushing, anticipate vaginal delivery        [facility]-Octaviano Peña MD  Obstetrics  [facility]

## 2025-01-22 VITALS
RESPIRATION RATE: 17 BRPM | SYSTOLIC BLOOD PRESSURE: 134 MMHG | HEART RATE: 89 BPM | TEMPERATURE: 98 F | OXYGEN SATURATION: 97 % | DIASTOLIC BLOOD PRESSURE: 84 MMHG

## 2025-01-22 PROBLEM — Z34.90 ENCOUNTER FOR INDUCTION OF LABOR: Status: ACTIVE | Noted: 2025-01-22

## 2025-01-22 PROCEDURE — 25000003 PHARM REV CODE 250: Performed by: OBSTETRICS & GYNECOLOGY

## 2025-01-22 PROCEDURE — 72200005 HC VAGINAL DELIVERY LEVEL II

## 2025-01-22 PROCEDURE — 99231 SBSQ HOSP IP/OBS SF/LOW 25: CPT | Mod: ,,, | Performed by: OBSTETRICS & GYNECOLOGY

## 2025-01-22 RX ORDER — IBUPROFEN 600 MG/1
600 TABLET ORAL EVERY 6 HOURS PRN
Qty: 30 TABLET | Refills: 0 | Status: SHIPPED | OUTPATIENT
Start: 2025-01-22

## 2025-01-22 RX ADMIN — IBUPROFEN 600 MG: 600 TABLET ORAL at 06:01

## 2025-01-22 RX ADMIN — PRENATAL VITAMINS-IRON FUMARATE 27 MG IRON-FOLIC ACID 0.8 MG TABLET 1 TABLET: at 09:01

## 2025-01-22 NOTE — DISCHARGE SUMMARY
[facility]  Obstetrics  Discharge Summary      Patient Name: Juan Mcdaniels  MRN: [id number]  Admission Date: 2025  Hospital Length of Stay: 3 days  Discharge Date and Time:  2025 9:09 AM  Attending Physician: Jannet Peña MD   Discharging Provider: Jannet Peña MD   Primary Care Provider: Noemi, Primary Doctor    HPI: Pt is a 28 yo  @ 40w2d presented to L&D on 25 with c/o ROM @ 20:30 clear fluid  Pt denies ctx on admission  ROM plus positive, pt was admitted        * No surgery found *     Hospital Course:   She was 1.5 cm on admission, cytotec was given, cervix changed to 3 cm, pitocin was started  25 s/p  over 2nd degree midline laceration, c/b PPH from uterine atony s/p cytotec x 1 dose  25. PPD # 1. Doing well   25. PPD # 2. Pt underwent a routine postpartum course.  Her lochia was appropriate.  And pain is controlled with medication.  Her vital signs were stable and she remained afebrile.  Pt was discharged to home in stable condition.          Final Active Diagnoses:    Diagnosis Date Noted POA    PRINCIPAL PROBLEM:   (spontaneous vaginal delivery) [O80] 2025 Not Applicable      Problems Resolved During this Admission:    Diagnosis Date Noted Date Resolved POA    Obesity affecting pregnancy, antepartum [O99.210] 2025 Yes        Significant Diagnostic Studies: Labs: CBC   Recent Labs   Lab 25  0722   WBC 10.14   HGB 9.3*   HCT 28.6*   *         Feeding Method: breast    Immunizations       Date Immunization Status Dose Route/Site Given by    25 1700 MMR Incomplete 0.5 mL Subcutaneous/     25 1700 Tdap Incomplete 0.5 mL Intramuscular/             Delivery:    Episiotomy: None   Lacerations: 2nd   Repair suture:     Repair # of packets: 2   Blood loss (ml):       Birth information:  YOB: [birth year]25   Time of birth: 3:23 PM   Sex: male   Delivery type: Vaginal, Spontaneous   Gestational Age: 40w2d      Measurements    Weight: 3340 g  Weight (lbs): 7 lb 5.8 oz  Length:          Delivery Clinician: Delivery Providers    Delivering clinician: Jannet Peña MD   Provider Role    Raquel Arriaga RN Delivery Nurse    Yelena Chavira RN Registered Nurse    [facility]             Additional  information:  Forceps:    Vacuum:    Breech:    Observed anomalies      Living?:     Apgars    Living status: Living  Apgar Component Scores:  1 min.:  5 min.:  10 min.:  15 min.:  20 min.:    Skin color:  1  1       Heart rate:  2  2       Reflex irritability:  2  2       Muscle tone:  2  2       Respiratory effort:  2  2       Total:  9  9       Apgars assigned by: VERITO CHAVIRA RN         Placenta: Delivered:       appearance  Pending Diagnostic Studies:       Procedure Component Value Units Date/Time    Treponema Pallidium Antibodies IgG, IgM [[id number]]     Order Status: Sent Lab Status: No result     Specimen: Blood             Discharged Condition: good    Disposition: Home or Self Care    Follow Up:   Follow-up Information       Jannet Peña MD Follow up in 6 week(s).    Specialties: Obstetrics and Gynecology, Obstetrics, Gynecology  Contact information:  [address]  [place] [id number] [phone]                           Patient Instructions:      BREAST PUMP FOR HOME USE     Order Specific Question Answer Comments   Type of pump: Electric    Weight: 111.2 kg    Length of need (1-99 months): 99      Diet Adult Regular     Pelvic Rest     Notify your health care provider if you experience any of the following:  increased confusion or weakness     Notify your health care provider if you experience any of the following:  persistent dizziness, light-headedness, or visual disturbances     Notify your health care provider if you experience any of the following:  worsening rash     Notify your health care provider if you experience any of the following:  severe persistent headache      Notify your health care provider if you experience any of the following:  difficulty breathing or increased cough     Notify your health care provider if you experience any of the following:  redness, tenderness, or signs of infection (pain, swelling, redness, odor or green/yellow discharge around incision site)     Notify your health care provider if you experience any of the following:  severe uncontrolled pain     Notify your health care provider if you experience any of the following:  temperature >100.4     Notify your health care provider if you experience any of the following:  persistent nausea and vomiting or diarrhea     Activity as tolerated     Medications:  Current Discharge Medication List        START taking these medications    Details   ibuprofen (ADVIL,MOTRIN) 600 MG tablet Take 1 tablet (600 mg total) by mouth every 6 (six) hours as needed for Pain.  Qty: 30 tablet, Refills: 0           CONTINUE these medications which have NOT CHANGED    Details   docusate sodium (COLACE) 100 MG capsule Take 1 capsule (100 mg total) by mouth 2 (two) times daily.  Qty: 60 capsule, Refills: 0      ferrous sulfate 325 (65 FE) MG EC tablet Take 1 tablet (325 mg total) by mouth once daily.  Qty: 90 tablet, Refills: 2      PNV,calcium 72/iron/folic acid (PRENATAL VITAMIN) Tab Take 1 tablet by mouth once daily.  Qty: 90 tablet, Refills: 3    Comments: Please substitute to comparable prenatal vitamins if not covered by pt insurance plan or per pt request.  Associated Diagnoses: Positive pregnancy test           STOP taking these medications       aspirin (ECOTRIN) 81 MG EC tablet Comments:   Reason for Stopping:         ondansetron (ZOFRAN-ODT) 4 MG TbDL Comments:   Reason for Stopping:               Jannet Peña MD  Obstetrics  [facility] [place] [facility]

## 2025-01-22 NOTE — PLAN OF CARE
Problem: Adult Inpatient Plan of Care  Goal: Plan of Care Review  Outcome: Met  Goal: Patient-Specific Goal (Individualized)  Outcome: Met  Goal: Absence of Hospital-Acquired Illness or Injury  Outcome: Met     Problem:  Fall Injury Risk  Goal: Absence of Fall, Infant Drop and Related Injury  Outcome: Met     Problem: Breastfeeding  Goal: Effective Breastfeeding  Outcome: Met     Problem: Postpartum (Vaginal Delivery)  Goal: Successful Parent Role Transition  Outcome: Met  Goal: Absence of Infection Signs and Symptoms  Outcome: Met  Goal: Optimal Pain Control and Function  Outcome: Met  Goal: Effective Urinary Elimination  Outcome: Met     Patient discharged per order. VS stable with no signs of distress. Discharge instructions reviewed with patient. Reviewed urgent maternal warning signs and instructed to go to ER or call physician if symptoms occur. Reviewed signs and symptoms of depression and anxiety and provided relevant handouts. Reviewed community resources available. Instructed on follow-up appointment with physician. Patient voiced understanding of all.

## 2025-01-22 NOTE — PLAN OF CARE
Problem: Adult Inpatient Plan of Care  Goal: Plan of Care Review  Outcome: Adequate for Care Transition     Problem: Adult Inpatient Plan of Care  Goal: Patient-Specific Goal (Individualized)  Outcome: Adequate for Care Transition     Problem: Adult Inpatient Plan of Care  Goal: Absence of Hospital-Acquired Illness or Injury  Outcome: Adequate for Care Transition     Problem:  Fall Injury Risk  Goal: Absence of Fall, Infant Drop and Related Injury  Outcome: Adequate for Care Transition     Problem: Breastfeeding  Goal: Effective Breastfeeding  Outcome: Adequate for Care Transition     Problem: Postpartum (Vaginal Delivery)  Goal: Successful Parent Role Transition  Outcome: Adequate for Care Transition     Problem: Postpartum (Vaginal Delivery)  Goal: Absence of Infection Signs and Symptoms  Outcome: Adequate for Care Transition     Problem: Postpartum (Vaginal Delivery)  Goal: Optimal Pain Control and Function  Outcome: Adequate for Care Transition     Problem: Postpartum (Vaginal Delivery)  Goal: Effective Urinary Elimination  Outcome: Adequate for Care Transition

## 2025-01-23 NOTE — HOSPITAL COURSE
Pt received 5 doses of cytotec with minimal cervical change at 1.5/50/-3.  Pt reported minimal cramping

## 2025-01-23 NOTE — DISCHARGE SUMMARY
[facility] [place] - [place] & [place]  Obstetrics & Gynecology  Discharge Summary    Patient Name: Juan Mcdaniels  MRN: [id number]  Admission Date: 1/15/2025  Hospital Length of Stay: 0 days  Discharge Date and Time: 25  Attending Physician: Noemi att. providers found   Discharging Provider: Jannet Peña MD  Primary Care Provider: Noemi, Primary Doctor    HPI:  Pt is 30 yo   @ 39w4d presented to LD& for elective IOL  She was 1 cm on admission    Hospital Course:  Pt received 5 doses of cytotec with minimal cervical change at 1.5/50/-3.  Pt reported minimal cramping  Given pt with minimal change, she desired to go home and return for IOL next week - given this is an elective IOL  She was discharge home with strict labor precautions.     Goals of Care Treatment Preferences:          Pending Diagnostic Studies:       None          Final Active Diagnoses:    Diagnosis Date Noted POA    PRINCIPAL PROBLEM:  Encounter for induction of labor [Z34.90] 2025 Not Applicable      Problems Resolved During this Admission:    Diagnosis Date Noted Date Resolved POA    Obesity affecting pregnancy, antepartum [O99.210] 2025 Yes        Discharged Condition: stable    Disposition: Home or Self Care    Follow Up:    Patient Instructions:      Diet diabetic     Notify your health care provider if you experience any of the following:  increased confusion or weakness     Notify your health care provider if you experience any of the following:  persistent dizziness, light-headedness, or visual disturbances     Notify your health care provider if you experience any of the following:  worsening rash     Notify your health care provider if you experience any of the following:  severe persistent headache     Notify your health care provider if you experience any of the following:  difficulty breathing or increased cough     Notify your health care provider if you experience any of the following:  redness, tenderness, or signs of  infection (pain, swelling, redness, odor or green/yellow discharge around incision site)     Notify your health care provider if you experience any of the following:  severe uncontrolled pain     Notify your health care provider if you experience any of the following:  persistent nausea and vomiting or diarrhea     Notify your health care provider if you experience any of the following:  temperature >100.4     Activity as tolerated     Medications:  Reconciled Home Medications:      Medication List        CHANGE how you take these medications      docusate sodium 100 MG capsule  Commonly known as: COLACE  Take 1 capsule (100 mg total) by mouth 2 (two) times daily.  What changed: Another medication with the same name was removed. Continue taking this medication, and follow the directions you see here.            CONTINUE taking these medications      ferrous sulfate 325 (65 FE) MG EC tablet  Take 1 tablet (325 mg total) by mouth once daily.     prenatal vitamin Tab  Take 1 tablet by mouth once daily.            STOP taking these medications      ABRYSVO (PF) 120 mcg/0.5 mL Solr vaccine  Generic drug: RSV, preF A and preF B(PF)              Jannet Peña MD  Obstetrics & Gynecology  [facility] - Labor & Delivery

## 2025-01-23 NOTE — H&P
[facility] - Labor & Delivery  Obstetrics & Gynecology  History & Physical    Patient Name: Juan Mcdaniels  MRN: [id number]  Admission Date: 1/15/2025  Primary Care Provider: Noemi, Primary Doctor    Subjective:     Chief Complaint/Reason for Admission: induction of labor    History of Present Illness:  Pt is 28 yo   @ 39w4d presented to & for elective IOL  She was 1 cm on admission        OB History    Para Term  AB Living   1 1 1 0 0 1   SAB IAB Ectopic Multiple Live Births   0 0 0 0 1      # Outcome Date GA Lbr Víctor/2nd Weight Sex Type Anes PTL Lv   1 Term 25 40w2d / 03:22 3.34 kg (7 lb 5.8 oz) M Vag-Spont EPI N MAI      Name: Boy Juan Mcdaniels      Apgar1: 9  Apgar5: 9     History reviewed. No pertinent past medical history.  History reviewed. No pertinent surgical history.    No medications prior to admission.       Review of patient's allergies indicates:  No Known Allergies     Family History    None       Tobacco Use    Smoking status: Never    Smokeless tobacco: Never   Substance and Sexual Activity    Alcohol use: Not Currently    Drug use: Never    Sexual activity: Yes     Review of Systems   Constitutional:  Negative for appetite change, chills and fever.   Respiratory:  Negative for shortness of breath.    Cardiovascular:  Negative for chest pain.   Gastrointestinal:  Negative for abdominal pain, blood in stool, constipation, diarrhea, nausea and vomiting.   Endocrine: Negative for hair loss and hot flashes.   Genitourinary:  Negative for decreased libido, dysmenorrhea, dyspareunia, dysuria, genital sores, menorrhagia, menstrual problem, pelvic pain, vaginal discharge, vaginal pain, urinary incontinence and vaginal odor.   Musculoskeletal:  Negative for back pain.   Integumentary:  Negative for rash, acne, hair changes, breast mass, nipple discharge and breast skin changes.   Breast: Negative for mass, mastodynia, nipple discharge and skin changes     Objective:     Vital Signs (Most  Recent):  Temp: 98.1 °F (36.7 °C) (01/16/25 0300)  Pulse: 91 (01/16/25 0749)  Resp: 20 (01/15/25 1905)  BP: 123/80 (01/16/25 0558)  SpO2: 99 % (01/16/25 0749) Vital Signs (24h Range):  Temp:  [97.6 °F (36.4 °C)-98.5 °F (36.9 °C)] 97.6 °F (36.4 °C)  Pulse:  [76-99] 89  Resp:  [17-20] 17  SpO2:  [97 %-98 %] 97 %  BP: (124-135)/(81-93) 134/84     Weight: 111.2 kg (245 lb 2.4 oz)  Body mass index is 39.57 kg/m².    Patient's last menstrual period was 04/11/2024.     Physical Exam:   Constitutional: She is oriented to person, place, and time. She appears well-developed and well-nourished.    HENT:   Head: Normocephalic and atraumatic.    Eyes: Pupils are equal, round, and reactive to light. EOM are normal.     Cardiovascular:       Exam reveals no clubbing and no cyanosis.        Pulmonary/Chest: Effort normal.        Abdominal: Soft. She exhibits no mass. There is no rebound and no guarding. No hernia.             Musculoskeletal: Normal range of motion and moves all extremeties.       Neurological: She is alert and oriented to person, place, and time.    Skin: Skin is warm. No cyanosis. Nails show no clubbing.    Psychiatric: She has a normal mood and affect. Her behavior is normal. Thought content normal.   SVE:  1.5/50/-3 vertex by suture    NST:  130 bpm, mod gregory, +accels, no decels  [place]: irregular ctx     Laboratory:  Recent Lab Results       None            Diagnostic Results:    Assessment/Plan:     Obstetric  * Encounter for induction of labor  Pt still with unfavorable cervix after 5 doses of cytotec  NST reassuring  Pt desired to go home and return for IOL next week - which is reasonable given this is an elective IOL        Jannet Peña MD  Obstetrics & Gynecology  [facility] - [place] - Labor & Delivery

## 2025-01-23 NOTE — SUBJECTIVE & OBJECTIVE
OB History    Para Term  AB Living   1 1 1 0 0 1   SAB IAB Ectopic Multiple Live Births   0 0 0 0 1      # Outcome Date GA Lbr Víctor/2nd Weight Sex Type Anes PTL Lv   1 Term 25 40w2d / 03:22 3.34 kg (7 lb 5.8 oz) M Vag-Spont EPI N MAI      Name: Alec Mcdaniels      Apgar1: 9  Apgar5: 9     History reviewed. No pertinent past medical history.  History reviewed. No pertinent surgical history.    No medications prior to admission.       Review of patient's allergies indicates:  No Known Allergies     Family History    None       Tobacco Use    Smoking status: Never    Smokeless tobacco: Never   Substance and Sexual Activity    Alcohol use: Not Currently    Drug use: Never    Sexual activity: Yes     Review of Systems   Constitutional:  Negative for appetite change, chills and fever.   Respiratory:  Negative for shortness of breath.    Cardiovascular:  Negative for chest pain.   Gastrointestinal:  Negative for abdominal pain, blood in stool, constipation, diarrhea, nausea and vomiting.   Endocrine: Negative for hair loss and hot flashes.   Genitourinary:  Negative for decreased libido, dysmenorrhea, dyspareunia, dysuria, genital sores, menorrhagia, menstrual problem, pelvic pain, vaginal discharge, vaginal pain, urinary incontinence and vaginal odor.   Musculoskeletal:  Negative for back pain.   Integumentary:  Negative for rash, acne, hair changes, breast mass, nipple discharge and breast skin changes.   Breast: Negative for mass, mastodynia, nipple discharge and skin changes     Objective:     Vital Signs (Most Recent):  Temp: 98.1 °F (36.7 °C) (25 0300)  Pulse: 91 (25 0749)  Resp: 20 (01/15/25 1905)  BP: 123/80 (25 0558)  SpO2: 99 % (25 0749) Vital Signs (24h Range):  Temp:  [97.6 °F (36.4 °C)-98.5 °F (36.9 °C)] 97.6 °F (36.4 °C)  Pulse:  [76-99] 89  Resp:  [17-20] 17  SpO2:  [97 %-98 %] 97 %  BP: (124-135)/(81-93) 134/84     Weight: 111.2 kg (245 lb 2.4 oz)  Body mass  index is 39.57 kg/m².    Patient's last menstrual period was 04/11/2024.     Physical Exam:   Constitutional: She is oriented to person, place, and time. She appears well-developed and well-nourished.    HENT:   Head: Normocephalic and atraumatic.    Eyes: Pupils are equal, round, and reactive to light. EOM are normal.     Cardiovascular:       Exam reveals no clubbing and no cyanosis.        Pulmonary/Chest: Effort normal.        Abdominal: Soft. She exhibits no mass. There is no rebound and no guarding. No hernia.             Musculoskeletal: Normal range of motion and moves all extremeties.       Neurological: She is alert and oriented to person, place, and time.    Skin: Skin is warm. No cyanosis. Nails show no clubbing.    Psychiatric: She has a normal mood and affect. Her behavior is normal. Thought content normal.     SVE: 1.5/50/-3 vertex  NST:  130 bpm, mod gregory, +accels, no decels  [place]: irregular ctx     Laboratory:  Recent Lab Results       None            Diagnostic Results:

## 2025-01-23 NOTE — ASSESSMENT & PLAN NOTE
Pt still with unfavorable cervix after 5 doses of cytotec  NST reassuring  Pt desired to go home and return for IOL next week - which is reasonable given this is an elective IOL

## 2025-01-24 ENCOUNTER — PATIENT MESSAGE (OUTPATIENT)
Dept: LACTATION | Facility: CLINIC | Age: 30
End: 2025-01-24
Payer: MEDICAID

## 2025-01-29 ENCOUNTER — PATIENT MESSAGE (OUTPATIENT)
Dept: OBSTETRICS AND GYNECOLOGY | Facility: CLINIC | Age: 30
End: 2025-01-29
Payer: MEDICAID

## 2025-02-28 ENCOUNTER — POSTPARTUM VISIT (OUTPATIENT)
Dept: OBSTETRICS AND GYNECOLOGY | Facility: CLINIC | Age: 30
End: 2025-02-28
Payer: MEDICAID

## 2025-02-28 VITALS
WEIGHT: 228.19 LBS | DIASTOLIC BLOOD PRESSURE: 80 MMHG | BODY MASS INDEX: 36.83 KG/M2 | SYSTOLIC BLOOD PRESSURE: 118 MMHG

## 2025-02-28 PROCEDURE — 99999 PR PBB SHADOW E&M-EST. PATIENT-LVL II: CPT | Mod: PBBFAC,,, | Performed by: OBSTETRICS & GYNECOLOGY

## 2025-02-28 PROCEDURE — 99212 OFFICE O/P EST SF 10 MIN: CPT | Mod: PBBFAC | Performed by: OBSTETRICS & GYNECOLOGY

## 2025-02-28 NOTE — PROGRESS NOTES
CC: Post-partum follow-up    Juan Mcdaniels is a 29 y.o. female  who presents for post-partum visit.  She is S/P a .  She and the baby are doing well.  No pain.  No fever.   No bowel / bladder complaints.      Delivery Date:   Delivery MD: Mariana  Gender: male  Birth Weight: 7 pounds 8 ounces  Breast Feeding: YES  Depression: NO  Contraception: no method    Pregnancy was complicated by:  Obesity, anemia    /80   Wt 103.5 kg (228 lb 2.8 oz)   LMP 2024   Breastfeeding Yes   BMI 36.83 kg/m²     ROS:  GENERAL: No fever, chills, fatigability.  VULVAR: No pain, no lesions and no itching.  VAGINAL: No relaxation, no itching, no discharge, no abnormal bleeding and no lesions.  ABDOMEN: No abdominal pain. Denies nausea. Denies vomiting. No diarrhea. No constipation  BREAST: Denies pain. No lumps. No discharge.  URINARY: No incontinence, no nocturia, no frequency and no dysuria.  CARDIOVASCULAR: No chest pain. No shortness of breath. No leg cramps.  NEUROLOGICAL: No headaches. No vision changes.    PHYSICAL EXAM:  ABDOMEN:  Soft, non-tender, non-distended  VULVA:  Normal, no lesions  PERINEUM:  Healing well  CERVIX:  Without lesions, polyps or tenderness.  UTERUS:  Normal size, shape, consistency, no mass or tenderness.  ADNEXA:  Normal in size without mass or tenderness    IMP:  Doing well S/P   Instructions / precautions reviewed  Contraceptive counseling      PLAN:  Pap neg 2024  Declined contraception  Ok to resume normal activities  F/u in 1 year